# Patient Record
Sex: FEMALE | Race: WHITE | URBAN - METROPOLITAN AREA
[De-identification: names, ages, dates, MRNs, and addresses within clinical notes are randomized per-mention and may not be internally consistent; named-entity substitution may affect disease eponyms.]

---

## 2017-01-18 ENCOUNTER — OFFICE VISIT (OUTPATIENT)
Dept: PLASTIC SURGERY | Facility: CLINIC | Age: 37
End: 2017-01-18

## 2017-01-18 VITALS
WEIGHT: 136.3 LBS | SYSTOLIC BLOOD PRESSURE: 124 MMHG | BODY MASS INDEX: 22.71 KG/M2 | HEART RATE: 80 BPM | OXYGEN SATURATION: 100 % | DIASTOLIC BLOOD PRESSURE: 81 MMHG | TEMPERATURE: 98.3 F | HEIGHT: 65 IN

## 2017-01-18 DIAGNOSIS — R63.4 EXCESSIVE WEIGHT LOSS: Primary | ICD-10-CM

## 2017-01-18 ASSESSMENT — PAIN SCALES - GENERAL: PAINLEVEL: NO PAIN (0)

## 2017-01-18 NOTE — MR AVS SNAPSHOT
After Visit Summary   1/18/2017    Emily Perez    MRN: [id number]           Patient Information     Date Of Birth          1980        Visit Information        Provider Department      1/18/2017 8:30 AM ABHI Gonzalez MD Plastic and Reconstructive Surgery        Today's Diagnoses     Excessive weight loss    -  1       Care Instructions    BREAST REDUCTION POST-OPERATIVE INSTRUCTIONS    Instructions       Have someone drive you home after surgery and help you at home for 1-2      days.      Get plenty of rest.      Follow balanced diet.      Decreased activity may promote constipation, so you may want to add      more raw fruit to your diet, and be sure to increase fluid intake. Your doctor       may also order a stool softener.      Take pain medication as prescribed. Do not take aspirin or any products      containing aspirin.      Do not drink alcohol when taking pain medications.      Even when not taking pain medications, no alcohol for 3 weeks as it      causes fluid retention.      If you are taking vitamins with iron, resume these as tolerated.      Do not smoke, as smoking delays healing and increases the risk of      complications.    Activities      Do not drive fpr 10 days following your procedure and until you are no longer taking        any pain medications (narcotics).      Do not drive until you have full range of motion with your arms and can stop the car       or swerve in an emergency.      Start walking the evening of surgery, this helps to reduce swelling and       lowers the chance of blood clots.      Refrain from vigorous activities for 2-6 weeks.  Increase activity gradually as tolerated.      After 2 weeks, you may perform lower body exercise, but must wait the full 6 weeks       prior to performing upper body exercise      Avoid lifting anything over 5 pounds for 2 weeks.      Resume social and employment activities in about 2 weeks (if not too  strenuous).    Incision Care      You may shower in 48 hours.  If drainage tubes have been used, you may shower       48 hours after removal of the drains.      Avoid exposing scars to sun for at least 12 months.      Always use a strong sunblock, if sun exposure is unavoidable (SPF 50 or      greater).      Keep the tape on until it starts to curl up on the ends, and then gently remove them.        You may use moisturizing cream at 10 days to help the tape come off. By two weeks,      you may pull off the tape if still in place.      Wear your surgical bra/wrap 24/7 as directed for 4 weeks.      Avoid bras with stays and underwires for 4-6 weeks.      You may pad the incisions with gauze for comfort (panty liners also work well as they        are absorbent and inexpensive).      Inspect daily for signs of infection.      No tub soaking, bathing, or swimming until wounds are healed.      If your breast skin is dry after surgery, you can apply a moisturizer several times a       day.     What to Expect      Despite the three layers of sutures closing your incisions, there will be some oozing       of tissue fluid from them for 2 days or so.  This will soak up on the gauze and the bra       to look like more than it really is.  Report any significant drainage to the clinic.      Most of the higher discomfort will subside after the first few days.      You may experience temporary soreness, bruising, swelling and tightnessin the       breasts as well as discomfort in the incision area.      You may have have normal sensation in the nipples.  This may be more or less than       usual, and usually returns over a couple of months.      Your first menstruation following surgery may cause your breasts to swell and hurt.      You may have random shooting pains, tingling, or other strange sensations in the            skin for a few months.  These will subside.    Appearance      Most of the discoloration and swelling will  "subside in 2-4 weeks.      Your breasts will feel firm to the touch initially, but will soften with time.      A more natural shape will occur as the breasts \"settle\" in a slightly lower position over     the first few months.      Scars may be red and thick for 6-12 months (longer in lighter-skinned patients).  IN          time, these usually soften and fade.    Follow-Up Care      Typically, you will have a post op check at 1-2 weeks, and again with your surgeon in      another month.      If drainage tubes have been used, will be removed when the drainage is less than 30      ml per day for 1-2 days.  This usually happens in 1-3 weeks.    When to Call      If you have increased swelling or bruising, particular one side greater than the other.      If swelling and redness persist after a few days.      If you have increased redness along the incision.      If you have severe or increased pain not relieved by medication.      If you have any side effects to medications; such as, rash, nausea,      headache, vomiting, or constipation.      If you have an oral temperature over 100.4 degrees.      If you have any yellowish or greenish drainage from the incisions or      notice a foul odor.      If you have bleeding from the incisions that is difficult to control with      light pressure.      If you have loss of feeling or motion.      Any unanswered concern.    For Medical Questions, Please Call:      [phone], Monday - Friday, 8 a.m. - 4:30 p.m.      After hours and on weekends, call Hospital Paging at [phone] and      ask for the Plastic Surgeon on call.      INFORMED CONSENT- REDUCTION MAMMAPLASTY    INSTRUCTIONS  This is an informed-consent document that has been prepared to help your plastic surgeon inform you of reduction mammaplasty surgery, its risks, and alternative treatment. It is important that you read this information carefully and completely.    GENERAL INFORMATION  Women who have large " breasts may experience a variety of problems from the weight and size of their breasts, such as back, neck, and shoulder pain, and skin irritation. Breast reduction is usually performed for relief of these symptoms rather than to enhance the appearance of the breasts. The best candidates are those who are mature enough to understand the procedure and have realistic expectations about the results. There are a variety of different surgical techniques used to reduce and reshape the female breast. There are both risks and complications associated with reduction mammaplasty surgery.    ALTERNATIVE TREATMENT  Reduction mammaplasty is an elective surgical operation. Alternative treatment would consist of not undergoing the surgical procedure, physical therapy to treat pain complaints, or wearing undergarments to support large breasts. In selected patients, liposuction has been used to reduce the size of large breasts. Risks and potential complications are associated with alternative surgical forms of treatment.    RISKS OF REDUCTION MAMMAPLASTY SURGERY  Every surgical procedure involves a certain amount of risk. It is important that you understand the risks involved with reduction mammaplasty. An individual s choice to undergo a surgical procedure is based on the comparison of the risk to potential benefit. Although the majority of women do not experience the following complications, you should discuss each of them with your plastic surgeon to make sure you understand the  risks, potential complications and consequences of breast reduction.  Bleeding- It is possible, though unusual, to experience a bleeding episode during or after surgery. Should post-operative bleeding occur, it may require emergency treatment to drain accumulated blood or blood transfusion. Do not take any aspirin or anti-inflammatory medications for ten days before surgery, as this may  increase the risk of bleeding.  Infection- An infection is quite  "unusual after this type of surgery. Should an infection occur, treatment including antibiotics or additional surgery may be necessary.  Change in nipple and skin sensation- You may experience a change in the sensitivity of the nipples and the skin of your breast. Permanent loss of nipple sensation can occur after a reduction mammaplasty in one or both nipples. Nipple discoloration and possible need for nipple graft.  Nipple necrosis-Loss of the nipple from blood supply issues are rare but can occur.  Skin contour irregularities- Contour and shape irregularities may occur after reduction mammaplasty.  Visible and palpable wrinkling may occur. One breast may be smaller than the other. Nipple position and shape will not be identical one side to the next.  Residual skin irregularities at the ends of the incisions or \"dog ears\" are always a possibility when there is excessive redundant skin. This may improve with time, or it can be surgically corrected.  Skin scarring- All surgical incisions produce scarring. The quality of these scars is unpredictable. Abnormal scars may occur within the skin and deeper tissue. In some cases, scars may require surgical revision or other treatments.  Unsatisfactory result- There is the possibility of a poor result from the reduction mammaplasty surgery. You may be disappointed with the size and shape of your breasts as there is an inability to promise a cup size.  Unanticipated finding- Breast cancer may be found during surgery which would require further treatment.  Pain- A breast reduction may not improve complaints of musculoskeletal pain in the neck, back and shoulders. Abnormal scarring in skin and the deeper tissues of the breast may produce pain.  Firmness- Excessive firmness of the breast can occur after surgery due to internal scarring or fat necrosis.  The occurrence of this is not predictable. If an area of fat necrosis or scarring appears, this may require biopsy or " additional surgical treatment.  Delayed healing- Wound disruption or delayed wound healing is possible. Some areas of the breast skin or nipple region may not heal normally and may take a long time to heal. It is even possible to have loss of skin or nipple tissue. This may require frequent dressing changes or further surgery to remove the non-healed tissue.  Smokers have a greater risk of skin loss and wound healing complications.  Asymmetry- Some breast asymmetry naturally occurs in most women. Differences in breast and nipple shape, size, or symmetry may also occur after surgery. Additional surgery may be necessary to revise asymmetry after a reduction mammaplasty.  Breast disease- Breast disease and breast cancer can occur independently of breast reduction surgery. It is recommended that all women perform periodic self examination of their breasts, have mammography according to American Cancer Society guidelines, and to seek professional care should a breast lump be detected.  Breast feeding- Although some women have been able to breast feed after breast reduction, most women are unable to breast feed.  Allergic reactions- In rare cases, local allergies to tape, suture material, or topical preparations have been reported. Systemic reactions which are more serious may occur to drugs used during surgery and prescription medicines. Allergic reactions may require additional treatment.  Damage to Deeper Structures-There is the potential for injury to deeper structures including nerves, blood vessels, muscles, and lungs (pneumothorax) during any surgical procedure. The potential for this to occur varies according to the type of procedure being performed. Injury to deeper structures may be temporary or permanent.  Surgical Anesthesia- Both local and general anesthesia involve risk. There is the possibility of complications, injury, and even death from all forms of surgical anesthesia or sedation.   Shock and Death: In  rare circumstances, your surgical procedure can cause severe trauma, particularly when multiple or extensive procedures are performed. Although serious complications are infrequent, infections or excessive fluid loss can lead to severe illness and even death. If surgical shock occurs, hospitalization and additional treatment would be necessary.  Pain- You will experience pain after your surgery. Pain of varying intensity and duration may occur.   Medications and Herbal Dietary Supplements: There are potential adverse reactions that occur as the result of aking over-the-counter, herbal, and/or prescription medications. Aspirin and medications that contain aspirin interfere with clotting and can cause more bleeding. These include non-steroidal anti-inflammatories such as  Motrin, Advil, and Alleve. It is very important not to stop drugs that interfere with platelets, such as Plavix, which is used after a stent. It is important if you have had a stent and are taking Plavix that you inform the plastic surgeon. Stopping Plavix may result in a heart attack, stroke and even death. Be sure to check with your physician about any drug interactions that may exist with medications which you are already taking. If you have an adverse reaction, stop the drugs immediately and call your plastic surgeon for further instructions. If the reaction is severe, go immediately to the nearest emergency room. When taking the prescribed pain medications after surgery, realize that they can affect your thought process and coordination. Do not drive, do not operate complex equipment, do not make any important decisions and do not drink any alcohol while taking these medications. Be sure to take your prescribed medication only as directed.  Cardiac, Renal, and Pulmonary Complications: Pulmonary complications may occur secondarily to both blood clots (pulmonary emboli), fat deposits (fat emboli) or partial collapse of the lungs after general  anesthesia. Pulmonary emboli can be life-threatening or fatal in some circumstances. Inactivity and other conditions may increase the incidence of blood clots traveling to the lungs causing a major blood clot that may result in death. It is important to discuss with your physician any past history of swelling in your legs or blood clots that may contribute to this condition. Cardiac complications are a risk with any surgery and anesthesia, even in patients without symptoms. If you experience shortness of breath, chest pain, or unusual heart beats, seek medical attention immediately. Should any of these complications occur, you may require hospitalization and additional treatment. Renal (kidney) failure may also occur.  Venous Thrombosis and Sequelae: Thrombosed veins, which resemble cords, occasionally develop in the area of the breast or around IV sites, and usually resolve without medical or surgical treatment. It is important to discuss with your surgeon any birth control pills you are taking. Certain high estrogen pills may increase your risk  of thrombosed veins.  Allergic Reactions: In rare cases, local allergies to tape, suture material and glues, blood products, topical preparations or injected agents have been reported. Serious systemic reactions including shock (anaphylaxis) may occur in response to drugs used during surgery and prescription medicines. Allergic reactions may require  additional treatment.  Persistent Swelling (Lymphedema): Persistent swelling in the legs can occur following surgery.  Breast Disease: Current medical information does not demonstrate an increased risk of breast cancer in women who have breast reduction. Individuals with a personal history or family history of breast cancer may be at a higher risk of developing breast cancer than a woman with no family history of this disease. It is recommended that all women perform periodic self-examination of their breasts, have mammography  according to American Cancer Society guidelines, and seek professional care should they notice a breast lump.   Smoking, Second-Hand Smoke Exposure, Nicotine Products (Patch, Gum, Nasal Spray): Patients who are currently smoking or use tobacco or nicotine products (patch, gum, or nasal spray) are at a greater risk for significant surgical complications of skin dying, delayed healing and additional scarring. Individuals exposed to second-hand smoke are also at potential risk for similar complications attributable to nicotine exposure. Additionally, smoking may have a significant negative effect on anesthesia and recovery from anesthesia, with coughing and possibly increased bleeding. Individuals who are not exposed to tobacco smoke or nicotine-containing products have a significantly lower risk of this type of complication. It is important to refrain from all nicotine at least 6 weeks before surgery and until your physician states it is safe toreturn, if desired. I acknowledge that I will inform my physician if I continue to smoke within this time frame, and understand that for my safety, the surgery, if possible, may be delayed.  Female Patient Information: It is important to inform your plastic surgeon if you use birth control pills, estrogen replacement, or if you suspect you may be pregnant. Many medications including antibiotics may neutralize the preventive effect of birth control pills, allowing for conception and pregnancy.   Intimate Relations After Surgery: Surgery involves coagulating of blood vessels and increased activity of any kind may open these vessels leading to a bleed, or hematoma. Activity that increases your pulse or heart rate may cause additional bruising, swelling, and the need for return to surgery and control bleeding. It is wise to refrain from intimate physical activities until your physician states it is safe.  Mental Health Disorders and Elective Surgery: It is important that all  patients seeking to undergo elective surgery have realistic expectations that focus on improvement rather than perfection. Complications or less than satisfactory results are sometimes unavoidable, may require additional surgery and often are stressful. Please  openly discuss with your surgeon, prior to surgery, any history that you may have of significant emotional depression or mental health disorders. Although many individuals may benefit psychologically from the results of elective surgery, effects on mental health cannot be accurately predicted.  Long Term Results-Subsequent alterations in breast shape may occur as the result of aging, sun exposure, weight loss or gain, pregnancy, menopause, or other circumstances not related to your surgery.  Breast sagginess may normally occur.  Some women experience re-enlaregment of their breasts over time and may require a repeat reduction in the future.    ADDITIONAL SURGERY NECESSARY (re-operations)    There are many variable conditions that may influence the long term result of reduction mammaplasty. Secondary surgery may be necessary to perform additional tightening or repositioning of the breasts. Should complications occur, additional surgery or other treatments may be necessary. Even though risks and complications occur infrequently, the risks cited are particularly associated with breast reduction surgery.  Other complications and risks can occur but are even more uncommon. The practice of medicine and surgery is not an exact science. Although good results are expected, there is no guarantee or warranty expressed or implied, on the results that may be obtained.    HEALTH INSURANCE  Depending on your particular health insurance plan, breast reduction surgery may be considered a covered benefit. There may be additional requirements in terms of the amount of breast tissue to be removed and duration of physical problems caused by large breasts. Breast reductions  involving removal of small amounts of tissue may not be covered by your insurance. Please review your health insurance subscriber-information pamphlet, call your insurance company, and discuss this further with your plastic surgeon. Many insurance plans exclude coverage for secondary or revisionary surgery.    DISCLAIMER  lnformed-consent documents are used to communicate information about the proposed surgical treatment of a disease or condition along with disclosure of risks and alternative forms of treatment(s). The informed consent process attempts to define principles of risk disclosure that should generally meet the needs of most patients in most circumstances.  However, informed consent documents should not be considered all inclusive in defining other methods of care and risks encountered. Your plastic surgeon may provide you with additional or different information which is based on all the facts in your particular case and the state of medical knowledge.Informed-consent documents are not intended to define or serve as the standard of medical care. Standards of medical care are determined on the basis of all of the facts involved in an individual case and are subject to change as scientific knowledge and technology advance and as practice patterns evolve. It is important that you read the above information carefully and have all of your questions answered.         Follow-ups after your visit        Your next 10 appointments already scheduled     Feb 03, 2017   Procedure with ABHI Gonzalez MD   [facility] [place]    [address] [place] [address] [phone]              Future tests that were ordered for you today     Open Future Orders        Priority Expected Expires Ordered    Mammo Screening digital (bilat) Routine  1/18/2018 1/18/2017            Who to contact     Please call your clinic at [phone] to:    Ask questions about your health    Make or cancel  "appointments    Discuss your medicines    Learn about your test results    Speak to your doctor   If you have compliments or concerns about an experience at your clinic, or if you wish to file a complaint, please contact [facility] Patient Relations at [phone] or email us at [email or web address]         Additional Information About Your Visit        [employer] Information     [employer] gives you secure access to your electronic health record. If you see a primary care provider, you can also send messages to your care team and make appointments. If you have questions, please call your primary care clinic.  If you do not have a primary care provider, please call [phone] and they will assist you.      [employer] is an electronic gateway that provides easy, online access to your medical records. With [employer], you can request a clinic appointment, read your test results, renew a prescription or communicate with your care team.     To access your existing account, please contact your [facility] or call [phone] for assistance.        Care EveryWhere ID     This is your Care EveryWhere ID. This could be used by other organizations to access your [facility] medical records  [id number]        Your Vitals Were     Pulse Temperature Height BMI (Body Mass Index) Pulse Oximetry Last Period    80 98.3  F (36.8  C) (Oral) 5' 5\" 22.68 kg/m2 100% 12/28/2016 (Approximate)       Blood Pressure from Last 3 Encounters:   01/18/17 124/81   11/16/16 122/84   10/21/16 156/112    Weight from Last 3 Encounters:   01/18/17 136 lb 4.8 oz   11/16/16 134 lb 1.6 oz   10/18/16 134 lb               Primary Care Provider Office Phone # Fax #    Kelly Ramirez PA-C [phone]       [facility] [address] [place] [address]        Thank you!     Thank you for choosing [facility]  for your care. Our goal is always to provide you with " excellent care. Hearing back from our patients is one way we can continue to improve our services. Please take a few minutes to complete the written survey that you may receive in the mail after your visit with us. Thank you!             Your Updated Medication List - Protect others around you: Learn how to safely use, store and throw away your medicines at www.disposemymeds.org.          This list is accurate as of: 1/18/17  8:48 AM.  Always use your most recent med list.                   Brand Name Dispense Instructions for use    levonorgestrel-ethinyl estradiol 0.1-20 MG-MCG per tablet    [facility] LESSINA     Take 1 tablet by mouth daily       Multi-vitamin Tabs tablet      Take 1 tablet by mouth daily       OMEGA-3 FISH OIL PO          psyllium 0.52 G capsule      Take 1 capsule by mouth daily       VITAMIN D (CHOLECALCIFEROL) PO      Take by mouth daily

## 2017-01-18 NOTE — PATIENT INSTRUCTIONS
BREAST REDUCTION POST-OPERATIVE INSTRUCTIONS    Instructions       Have someone drive you home after surgery and help you at home for 1-2      days.      Get plenty of rest.      Follow balanced diet.      Decreased activity may promote constipation, so you may want to add      more raw fruit to your diet, and be sure to increase fluid intake. Your doctor       may also order a stool softener.      Take pain medication as prescribed. Do not take aspirin or any products      containing aspirin.      Do not drink alcohol when taking pain medications.      Even when not taking pain medications, no alcohol for 3 weeks as it      causes fluid retention.      If you are taking vitamins with iron, resume these as tolerated.      Do not smoke, as smoking delays healing and increases the risk of      complications.    Activities      Do not drive fpr 10 days following your procedure and until you are no longer taking        any pain medications (narcotics).      Do not drive until you have full range of motion with your arms and can stop the car       or swerve in an emergency.      Start walking the evening of surgery, this helps to reduce swelling and       lowers the chance of blood clots.      Refrain from vigorous activities for 2-6 weeks.  Increase activity gradually as tolerated.      After 2 weeks, you may perform lower body exercise, but must wait the full 6 weeks       prior to performing upper body exercise      Avoid lifting anything over 5 pounds for 2 weeks.      Resume social and employment activities in about 2 weeks (if not too strenuous).    Incision Care      You may shower in 48 hours.  If drainage tubes have been used, you may shower       48 hours after removal of the drains.      Avoid exposing scars to sun for at least 12 months.      Always use a strong sunblock, if sun exposure is unavoidable (SPF 50 or      greater).      Keep the tape on until it starts to curl up on the ends, and then gently  "remove them.        You may use moisturizing cream at 10 days to help the tape come off. By two weeks,      you may pull off the tape if still in place.      Wear your surgical bra/wrap 24/7 as directed for 4 weeks.      Avoid bras with stays and underwires for 4-6 weeks.      You may pad the incisions with gauze for comfort (panty liners also work well as they        are absorbent and inexpensive).      Inspect daily for signs of infection.      No tub soaking, bathing, or swimming until wounds are healed.      If your breast skin is dry after surgery, you can apply a moisturizer several times a       day.     What to Expect      Despite the three layers of sutures closing your incisions, there will be some oozing       of tissue fluid from them for 2 days or so.  This will soak up on the gauze and the bra       to look like more than it really is.  Report any significant drainage to the clinic.      Most of the higher discomfort will subside after the first few days.      You may experience temporary soreness, bruising, swelling and tightnessin the       breasts as well as discomfort in the incision area.      You may have have normal sensation in the nipples.  This may be more or less than       usual, and usually returns over a couple of months.      Your first menstruation following surgery may cause your breasts to swell and hurt.      You may have random shooting pains, tingling, or other strange sensations in the            skin for a few months.  These will subside.    Appearance      Most of the discoloration and swelling will subside in 2-4 weeks.      Your breasts will feel firm to the touch initially, but will soften with time.      A more natural shape will occur as the breasts \"settle\" in a slightly lower position over     the first few months.      Scars may be red and thick for 6-12 months (longer in lighter-skinned patients).  IN          time, these usually soften and fade.    Follow-Up Care      " Typically, you will have a post op check at 1-2 weeks, and again with your surgeon in      another month.      If drainage tubes have been used, will be removed when the drainage is less than 30      ml per day for 1-2 days.  This usually happens in 1-3 weeks.    When to Call      If you have increased swelling or bruising, particular one side greater than the other.      If swelling and redness persist after a few days.      If you have increased redness along the incision.      If you have severe or increased pain not relieved by medication.      If you have any side effects to medications; such as, rash, nausea,      headache, vomiting, or constipation.      If you have an oral temperature over 100.4 degrees.      If you have any yellowish or greenish drainage from the incisions or      notice a foul odor.      If you have bleeding from the incisions that is difficult to control with      light pressure.      If you have loss of feeling or motion.      Any unanswered concern.    For Medical Questions, Please Call:      [phone], Monday - Friday, 8 a.m. - 4:30 p.m.      After hours and on weekends, call Hospital Paging at [phone] and      ask for the Plastic Surgeon on call.      INFORMED CONSENT- REDUCTION MAMMAPLASTY    INSTRUCTIONS  This is an informed-consent document that has been prepared to help your plastic surgeon inform you of reduction mammaplasty surgery, its risks, and alternative treatment. It is important that you read this information carefully and completely.    GENERAL INFORMATION  Women who have large breasts may experience a variety of problems from the weight and size of their breasts, such as back, neck, and shoulder pain, and skin irritation. Breast reduction is usually performed for relief of these symptoms rather than to enhance the appearance of the breasts. The best candidates are those who are mature enough to understand the procedure and have realistic expectations about the  results. There are a variety of different surgical techniques used to reduce and reshape the female breast. There are both risks and complications associated with reduction mammaplasty surgery.    ALTERNATIVE TREATMENT  Reduction mammaplasty is an elective surgical operation. Alternative treatment would consist of not undergoing the surgical procedure, physical therapy to treat pain complaints, or wearing undergarments to support large breasts. In selected patients, liposuction has been used to reduce the size of large breasts. Risks and potential complications are associated with alternative surgical forms of treatment.    RISKS OF REDUCTION MAMMAPLASTY SURGERY  Every surgical procedure involves a certain amount of risk. It is important that you understand the risks involved with reduction mammaplasty. An individual s choice to undergo a surgical procedure is based on the comparison of the risk to potential benefit. Although the majority of women do not experience the following complications, you should discuss each of them with your plastic surgeon to make sure you understand the  risks, potential complications and consequences of breast reduction.  Bleeding- It is possible, though unusual, to experience a bleeding episode during or after surgery. Should post-operative bleeding occur, it may require emergency treatment to drain accumulated blood or blood transfusion. Do not take any aspirin or anti-inflammatory medications for ten days before surgery, as this may  increase the risk of bleeding.  Infection- An infection is quite unusual after this type of surgery. Should an infection occur, treatment including antibiotics or additional surgery may be necessary.  Change in nipple and skin sensation- You may experience a change in the sensitivity of the nipples and the skin of your breast. Permanent loss of nipple sensation can occur after a reduction mammaplasty in one or both nipples. Nipple discoloration and  "possible need for nipple graft.  Nipple necrosis-Loss of the nipple from blood supply issues are rare but can occur.  Skin contour irregularities- Contour and shape irregularities may occur after reduction mammaplasty.  Visible and palpable wrinkling may occur. One breast may be smaller than the other. Nipple position and shape will not be identical one side to the next.  Residual skin irregularities at the ends of the incisions or \"dog ears\" are always a possibility when there is excessive redundant skin. This may improve with time, or it can be surgically corrected.  Skin scarring- All surgical incisions produce scarring. The quality of these scars is unpredictable. Abnormal scars may occur within the skin and deeper tissue. In some cases, scars may require surgical revision or other treatments.  Unsatisfactory result- There is the possibility of a poor result from the reduction mammaplasty surgery. You may be disappointed with the size and shape of your breasts as there is an inability to promise a cup size.  Unanticipated finding- Breast cancer may be found during surgery which would require further treatment.  Pain- A breast reduction may not improve complaints of musculoskeletal pain in the neck, back and shoulders. Abnormal scarring in skin and the deeper tissues of the breast may produce pain.  Firmness- Excessive firmness of the breast can occur after surgery due to internal scarring or fat necrosis.  The occurrence of this is not predictable. If an area of fat necrosis or scarring appears, this may require biopsy or additional surgical treatment.  Delayed healing- Wound disruption or delayed wound healing is possible. Some areas of the breast skin or nipple region may not heal normally and may take a long time to heal. It is even possible to have loss of skin or nipple tissue. This may require frequent dressing changes or further surgery to remove the non-healed tissue.  Smokers have a greater risk of skin " loss and wound healing complications.  Asymmetry- Some breast asymmetry naturally occurs in most women. Differences in breast and nipple shape, size, or symmetry may also occur after surgery. Additional surgery may be necessary to revise asymmetry after a reduction mammaplasty.  Breast disease- Breast disease and breast cancer can occur independently of breast reduction surgery. It is recommended that all women perform periodic self examination of their breasts, have mammography according to American Cancer Society guidelines, and to seek professional care should a breast lump be detected.  Breast feeding- Although some women have been able to breast feed after breast reduction, most women are unable to breast feed.  Allergic reactions- In rare cases, local allergies to tape, suture material, or topical preparations have been reported. Systemic reactions which are more serious may occur to drugs used during surgery and prescription medicines. Allergic reactions may require additional treatment.  Damage to Deeper Structures-There is the potential for injury to deeper structures including nerves, blood vessels, muscles, and lungs (pneumothorax) during any surgical procedure. The potential for this to occur varies according to the type of procedure being performed. Injury to deeper structures may be temporary or permanent.  Surgical Anesthesia- Both local and general anesthesia involve risk. There is the possibility of complications, injury, and even death from all forms of surgical anesthesia or sedation.   Shock and Death: In rare circumstances, your surgical procedure can cause severe trauma, particularly when multiple or extensive procedures are performed. Although serious complications are infrequent, infections or excessive fluid loss can lead to severe illness and even death. If surgical shock occurs, hospitalization and additional treatment would be necessary.  Pain- You will experience pain after your surgery.  Pain of varying intensity and duration may occur.   Medications and Herbal Dietary Supplements: There are potential adverse reactions that occur as the result of aking over-the-counter, herbal, and/or prescription medications. Aspirin and medications that contain aspirin interfere with clotting and can cause more bleeding. These include non-steroidal anti-inflammatories such as  Motrin, Advil, and Alleve. It is very important not to stop drugs that interfere with platelets, such as Plavix, which is used after a stent. It is important if you have had a stent and are taking Plavix that you inform the plastic surgeon. Stopping Plavix may result in a heart attack, stroke and even death. Be sure to check with your physician about any drug interactions that may exist with medications which you are already taking. If you have an adverse reaction, stop the drugs immediately and call your plastic surgeon for further instructions. If the reaction is severe, go immediately to the nearest emergency room. When taking the prescribed pain medications after surgery, realize that they can affect your thought process and coordination. Do not drive, do not operate complex equipment, do not make any important decisions and do not drink any alcohol while taking these medications. Be sure to take your prescribed medication only as directed.  Cardiac, Renal, and Pulmonary Complications: Pulmonary complications may occur secondarily to both blood clots (pulmonary emboli), fat deposits (fat emboli) or partial collapse of the lungs after general anesthesia. Pulmonary emboli can be life-threatening or fatal in some circumstances. Inactivity and other conditions may increase the incidence of blood clots traveling to the lungs causing a major blood clot that may result in death. It is important to discuss with your physician any past history of swelling in your legs or blood clots that may contribute to this condition. Cardiac complications are  a risk with any surgery and anesthesia, even in patients without symptoms. If you experience shortness of breath, chest pain, or unusual heart beats, seek medical attention immediately. Should any of these complications occur, you may require hospitalization and additional treatment. Renal (kidney) failure may also occur.  Venous Thrombosis and Sequelae: Thrombosed veins, which resemble cords, occasionally develop in the area of the breast or around IV sites, and usually resolve without medical or surgical treatment. It is important to discuss with your surgeon any birth control pills you are taking. Certain high estrogen pills may increase your risk  of thrombosed veins.  Allergic Reactions: In rare cases, local allergies to tape, suture material and glues, blood products, topical preparations or injected agents have been reported. Serious systemic reactions including shock (anaphylaxis) may occur in response to drugs used during surgery and prescription medicines. Allergic reactions may require  additional treatment.  Persistent Swelling (Lymphedema): Persistent swelling in the legs can occur following surgery.  Breast Disease: Current medical information does not demonstrate an increased risk of breast cancer in women who have breast reduction. Individuals with a personal history or family history of breast cancer may be at a higher risk of developing breast cancer than a woman with no family history of this disease. It is recommended that all women perform periodic self-examination of their breasts, have mammography according to American Cancer Society guidelines, and seek professional care should they notice a breast lump.   Smoking, Second-Hand Smoke Exposure, Nicotine Products (Patch, Gum, Nasal Spray): Patients who are currently smoking or use tobacco or nicotine products (patch, gum, or nasal spray) are at a greater risk for significant surgical complications of skin dying, delayed healing and additional  scarring. Individuals exposed to second-hand smoke are also at potential risk for similar complications attributable to nicotine exposure. Additionally, smoking may have a significant negative effect on anesthesia and recovery from anesthesia, with coughing and possibly increased bleeding. Individuals who are not exposed to tobacco smoke or nicotine-containing products have a significantly lower risk of this type of complication. It is important to refrain from all nicotine at least 6 weeks before surgery and until your physician states it is safe toreturn, if desired. I acknowledge that I will inform my physician if I continue to smoke within this time frame, and understand that for my safety, the surgery, if possible, may be delayed.  Female Patient Information: It is important to inform your plastic surgeon if you use birth control pills, estrogen replacement, or if you suspect you may be pregnant. Many medications including antibiotics may neutralize the preventive effect of birth control pills, allowing for conception and pregnancy.   Intimate Relations After Surgery: Surgery involves coagulating of blood vessels and increased activity of any kind may open these vessels leading to a bleed, or hematoma. Activity that increases your pulse or heart rate may cause additional bruising, swelling, and the need for return to surgery and control bleeding. It is wise to refrain from intimate physical activities until your physician states it is safe.  Mental Health Disorders and Elective Surgery: It is important that all patients seeking to undergo elective surgery have realistic expectations that focus on improvement rather than perfection. Complications or less than satisfactory results are sometimes unavoidable, may require additional surgery and often are stressful. Please  openly discuss with your surgeon, prior to surgery, any history that you may have of significant emotional depression or mental health disorders.  Although many individuals may benefit psychologically from the results of elective surgery, effects on mental health cannot be accurately predicted.  Long Term Results-Subsequent alterations in breast shape may occur as the result of aging, sun exposure, weight loss or gain, pregnancy, menopause, or other circumstances not related to your surgery.  Breast sagginess may normally occur.  Some women experience re-enlaregment of their breasts over time and may require a repeat reduction in the future.    ADDITIONAL SURGERY NECESSARY (re-operations)    There are many variable conditions that may influence the long term result of reduction mammaplasty. Secondary surgery may be necessary to perform additional tightening or repositioning of the breasts. Should complications occur, additional surgery or other treatments may be necessary. Even though risks and complications occur infrequently, the risks cited are particularly associated with breast reduction surgery.  Other complications and risks can occur but are even more uncommon. The practice of medicine and surgery is not an exact science. Although good results are expected, there is no guarantee or warranty expressed or implied, on the results that may be obtained.    HEALTH INSURANCE  Depending on your particular health insurance plan, breast reduction surgery may be considered a covered benefit. There may be additional requirements in terms of the amount of breast tissue to be removed and duration of physical problems caused by large breasts. Breast reductions involving removal of small amounts of tissue may not be covered by your insurance. Please review your health insurance subscriber-information pamphlet, call your insurance company, and discuss this further with your plastic surgeon. Many insurance plans exclude coverage for secondary or revisionary surgery.    DISCLAIMER  lnformed-consent documents are used to communicate information about the proposed surgical  treatment of a disease or condition along with disclosure of risks and alternative forms of treatment(s). The informed consent process attempts to define principles of risk disclosure that should generally meet the needs of most patients in most circumstances.  However, informed consent documents should not be considered all inclusive in defining other methods of care and risks encountered. Your plastic surgeon may provide you with additional or different information which is based on all the facts in your particular case and the state of medical knowledge.Informed-consent documents are not intended to define or serve as the standard of medical care. Standards of medical care are determined on the basis of all of the facts involved in an individual case and are subject to change as scientific knowledge and technology advance and as practice patterns evolve. It is important that you read the above information carefully and have all of your questions answered.

## 2017-01-18 NOTE — NURSING NOTE
"Chief Complaint   Patient presents with     Follow Up For     2 month post op check in       Filed Vitals:    01/18/17 0832   BP: 124/81   Pulse: 80   Temp: 98.3  F (36.8  C)   TempSrc: Oral   Height: 5' 5\"   Weight: 136 lb 4.8 oz   SpO2: 100%       Body mass index is 22.68 kg/(m^2).      Sally Song                            "

## 2017-01-18 NOTE — PROGRESS NOTES
PRESENTING COMPLAINT:  Preoperative visit for upcoming bilateral mastopexy and brachioplasty scheduled for 2017.      HISTORY OF PRESENTING COMPLAINT:  Ms. Wise is 36 years old, here for a preoperative visit for upcoming body contouring surgery.  She has had no change in her history and physical exam.  She is being cleared by her medical doctors in the coming week.      ASSESSMENT AND PLAN:  Based on above findings, a diagnosis of massive weight loss with symptomatic excess upper arm skin and grade 3 ptosis of the breasts was made.  The patient is undergoing bilateral mastopexy and brachioplasty.  I went over the procedure with the patient in detail, showed her where the scars would be.  All expectations were discussed.  All risks, benefits and alternatives including pain, infection, bleeding, scarring, asymmetry, seromas, hematomas, wound breakdown, wound dehiscence, seromas, hematomas, prominent scar, hypertrophic scar, keloid scar, asymmetries, requirement of further surgeries in the future, numbness, tingling, nipple sensory loss, swelling of the arms, numbness in the forearm, nipple necrosis, skin necrosis, fat necrosis, T-junction site necrosis, DVT, PE, MI, CVA, pneumonia and death were explained.  She understood them all and wants to proceed.  All questions were answered.  All exam and discussion done in the presence of my nurse.      Total time spent with patient was 15 minutes, more than half counseling.         ABHI OCASIO MD             D: 2017 11:34   T: 2017 17:58   MT: KG      Name:     CHANDRAKANT WISE   MRN:      [id number]        Account:      [id number]   :      1980           Service Date: 2017      Document: [id number]

## 2017-02-03 ENCOUNTER — SURGERY (OUTPATIENT)
Age: 37
End: 2017-02-03
Payer: COMMERCIAL

## 2017-02-03 ENCOUNTER — ANESTHESIA EVENT (OUTPATIENT)
Dept: SURGERY | Facility: AMBULATORY SURGERY CENTER | Age: 37
End: 2017-02-03

## 2017-02-03 ENCOUNTER — ANESTHESIA (OUTPATIENT)
Dept: SURGERY | Facility: AMBULATORY SURGERY CENTER | Age: 37
End: 2017-02-03

## 2017-02-03 ENCOUNTER — TELEPHONE (OUTPATIENT)
Dept: SURGERY | Facility: CLINIC | Age: 37
End: 2017-02-03

## 2017-02-03 PROCEDURE — 15836 EXC EXCESSIVE SKIN ARM: CPT | Mod: 50 | Performed by: PLASTIC SURGERY

## 2017-02-03 PROCEDURE — 19316 MASTOPEXY: CPT | Mod: 50 | Performed by: PLASTIC SURGERY

## 2017-02-03 RX ORDER — PROPOFOL 10 MG/ML
INJECTION, EMULSION INTRAVENOUS PRN
Status: DISCONTINUED | OUTPATIENT
Start: 2017-02-03 | End: 2017-02-03

## 2017-02-03 RX ORDER — DEXAMETHASONE SODIUM PHOSPHATE 4 MG/ML
INJECTION, SOLUTION INTRA-ARTICULAR; INTRALESIONAL; INTRAMUSCULAR; INTRAVENOUS; SOFT TISSUE PRN
Status: DISCONTINUED | OUTPATIENT
Start: 2017-02-03 | End: 2017-02-03

## 2017-02-03 RX ORDER — PROPOFOL 10 MG/ML
INJECTION, EMULSION INTRAVENOUS CONTINUOUS PRN
Status: DISCONTINUED | OUTPATIENT
Start: 2017-02-03 | End: 2017-02-03

## 2017-02-03 RX ORDER — ONDANSETRON 2 MG/ML
INJECTION INTRAMUSCULAR; INTRAVENOUS PRN
Status: DISCONTINUED | OUTPATIENT
Start: 2017-02-03 | End: 2017-02-03

## 2017-02-03 RX ORDER — FENTANYL CITRATE 50 UG/ML
INJECTION, SOLUTION INTRAMUSCULAR; INTRAVENOUS PRN
Status: DISCONTINUED | OUTPATIENT
Start: 2017-02-03 | End: 2017-02-03

## 2017-02-03 RX ORDER — LIDOCAINE HYDROCHLORIDE 20 MG/ML
INJECTION, SOLUTION INFILTRATION; PERINEURAL PRN
Status: DISCONTINUED | OUTPATIENT
Start: 2017-02-03 | End: 2017-02-03

## 2017-02-03 RX ADMIN — CEFAZOLIN SODIUM 2 G: 2 INJECTION, SOLUTION INTRAVENOUS at 07:35

## 2017-02-03 RX ADMIN — ONDANSETRON 4 MG: 2 INJECTION INTRAMUSCULAR; INTRAVENOUS at 10:39

## 2017-02-03 RX ADMIN — DEXAMETHASONE SODIUM PHOSPHATE 10 MG: 4 INJECTION, SOLUTION INTRA-ARTICULAR; INTRALESIONAL; INTRAMUSCULAR; INTRAVENOUS; SOFT TISSUE at 07:29

## 2017-02-03 RX ADMIN — CEFAZOLIN SODIUM 1 G: 2 INJECTION, SOLUTION INTRAVENOUS at 09:37

## 2017-02-03 RX ADMIN — Medication 0.5 MG: at 08:11

## 2017-02-03 RX ADMIN — PROPOFOL 50 MG: 10 INJECTION, EMULSION INTRAVENOUS at 07:59

## 2017-02-03 RX ADMIN — SODIUM CHLORIDE, SODIUM LACTATE, POTASSIUM CHLORIDE, CALCIUM CHLORIDE: 600; 310; 30; 20 INJECTION, SOLUTION INTRAVENOUS at 07:20

## 2017-02-03 RX ADMIN — FENTANYL CITRATE 100 MCG: 50 INJECTION, SOLUTION INTRAMUSCULAR; INTRAVENOUS at 07:26

## 2017-02-03 RX ADMIN — BUPIVACAINE HYDROCHLORIDE 50 ML: 2.5 INJECTION, SOLUTION INFILTRATION; PERINEURAL at 10:20

## 2017-02-03 RX ADMIN — PROPOFOL 200 MCG/KG/MIN: 10 INJECTION, EMULSION INTRAVENOUS at 07:26

## 2017-02-03 RX ADMIN — SODIUM CHLORIDE, SODIUM LACTATE, POTASSIUM CHLORIDE, CALCIUM CHLORIDE: 600; 310; 30; 20 INJECTION, SOLUTION INTRAVENOUS at 11:01

## 2017-02-03 RX ADMIN — PROPOFOL 200 MG: 10 INJECTION, EMULSION INTRAVENOUS at 07:26

## 2017-02-03 RX ADMIN — Medication 0.5 MG: at 11:01

## 2017-02-03 RX ADMIN — LIDOCAINE HYDROCHLORIDE 60 MG: 20 INJECTION, SOLUTION INFILTRATION; PERINEURAL at 07:26

## 2017-02-03 RX ADMIN — Medication 0.5 MG: at 08:00

## 2017-02-03 NOTE — ANESTHESIA CARE TRANSFER NOTE
Patient: Emily Perez    Procedure(s):  Brachioplasty and mastopexy - Wound Class: I-Clean   - Wound Class: I-Clean    Diagnosis: excessive skin  Diagnosis Additional Information: No value filed.    Anesthesia Type:   General, ETT     Note:  Airway :Nasal Cannula  Patient transferred to:PACU  Comments: To PACU, awake, comfortable, sats 100%, NC. Report to RN.      Vitals: (Last set prior to Anesthesia Care Transfer)    CRNA VITALS  2/3/2017 1031 - 2/3/2017 1107      2/3/2017             Pulse: 79    SpO2: 99 %                Electronically Signed By: BHARATI Alvarenga CRNA  February 3, 2017  11:07 AM

## 2017-02-03 NOTE — ANESTHESIA POSTPROCEDURE EVALUATION
Patient: Emily Perez    Procedure(s):  Brachioplasty and mastopexy - Wound Class: I-Clean   - Wound Class: I-Clean    Diagnosis:excessive skin  Diagnosis Additional Information: No value filed.    Anesthesia Type:  General, ETT    Note:  Anesthesia Post Evaluation    Patient location during evaluation: PACU  Patient participation: Able to fully participate in evaluation  Level of consciousness: awake  Pain management: adequate  Airway patency: patent  Cardiovascular status: acceptable  Respiratory status: acceptable  Hydration status: balanced  PONV: none     Anesthetic complications: None          Last vitals:  Filed Vitals:    02/03/17 1102 02/03/17 1105 02/03/17 1110   BP: 154/79 147/81    Temp: 97.1  F (36.2  C)     Resp: 14 9    SpO2: 100% 100% 100%         Electronically Signed By: Christopher Kraus MD  February 3, 2017  11:15 AM

## 2017-02-03 NOTE — TELEPHONE ENCOUNTER
Pt called, No answer.  does not identify pt. Left general message for pt to call the [facility] clinic back at [phone]....Trang De Leon RN

## 2017-02-03 NOTE — ANESTHESIA PREPROCEDURE EVALUATION
Anesthesia Evaluation     .        ROS/MED HX    ENT/Pulmonary:  - neg pulmonary ROS     Neurologic:       Cardiovascular:  - neg cardiovascular ROS       METS/Exercise Tolerance:     Hematologic:         Musculoskeletal:         GI/Hepatic:         Renal/Genitourinary:         Endo:         Psychiatric:         Infectious Disease:         Malignancy:         Other:               Physical Exam  Normal systems: pulmonary and dental    Airway   Mallampati: II  TM distance: >3 FB  Neck ROM: full    Dental     Cardiovascular   Rhythm and rate: regular and normal      Pulmonary                     Anesthesia Plan      History & Physical Review  History and physical reviewed and following examination; no interval change.    ASA Status:  2 .    NPO Status:  > 6 hours    Plan for General and ETT with Intravenous and Propofol induction. Maintenance will be TIVA.    PONV prophylaxis:  Ondansetron (or other 5HT-3)       Postoperative Care  Postoperative pain management:  IV analgesics and Multi-modal analgesia.      Consents                          .

## 2017-02-03 NOTE — TELEPHONE ENCOUNTER
----- Message from ABHI Gonzalez MD sent at 2/3/2017 10:53 AM CST -----  Regarding: Post-op appt  Hi,    Please make a 2 week post-op appt.    Thanks    Darrel

## 2017-02-06 NOTE — TELEPHONE ENCOUNTER
Pt left a Voice Mail message to return her call.    Called and spoke with pt.  Pt stated she needed a return appt with Dr. Gonzalez.  Pt requested 2/14/17 at 2:50pm.  appt scheduled.  Advised to call if any further questions or concerns.    Pamela Sellers LPN

## 2017-02-14 ENCOUNTER — OFFICE VISIT (OUTPATIENT)
Dept: SURGERY | Facility: CLINIC | Age: 37
End: 2017-02-14
Payer: COMMERCIAL

## 2017-02-14 DIAGNOSIS — R63.4 EXCESSIVE WEIGHT LOSS: Primary | ICD-10-CM

## 2017-02-14 PROCEDURE — 99024 POSTOP FOLLOW-UP VISIT: CPT | Performed by: PLASTIC SURGERY

## 2017-02-14 NOTE — MR AVS SNAPSHOT
After Visit Summary   2/14/2017    Emily Perez    MRN: [id number]           Patient Information     Date Of Birth          1980        Visit Information        Provider Department      2/14/2017 2:50 PM ABHI Gonzalez MD [facility]        Today's Diagnoses     Excessive weight loss    -  1       Follow-ups after your visit        Who to contact     If you have questions or need follow up information about today's clinic visit or your schedule please contact [facility] directly at [phone].  Normal or non-critical lab and imaging results will be communicated to you by Ukashhart, letter or phone within 4 business days after the clinic has received the results. If you do not hear from us within 7 days, please contact the clinic through Moonshoott or phone. If you have a critical or abnormal lab result, we will notify you by phone as soon as possible.  Submit refill requests through Fix That Bug or call your pharmacy and they will forward the refill request to us. Please allow 3 business days for your refill to be completed.          Additional Information About Your Visit        MyChart Information     Fix That Bug gives you secure access to your electronic health record. If you see a primary care provider, you can also send messages to your care team and make appointments. If you have questions, please call your primary care clinic.  If you do not have a primary care provider, please call [phone] and they will assist you.      Fix That Bug is an electronic gateway that provides easy, online access to your medical records. With Fix That Bug, you can request a clinic appointment, read your test results, renew a prescription or communicate with your care team.     To access your existing account, please contact your [facility] or call [phone] for assistance.        Care EveryWhere ID     This is your Care EveryWhere ID. This could be  used by other organizations to access your [place] medical records  [id number]        Your Vitals Were     Last Period                   01/23/2017            Blood Pressure from Last 3 Encounters:   02/03/17 142/80   01/18/17 124/81   11/16/16 122/84    Weight from Last 3 Encounters:   01/18/17 136 lb 4.8 oz   11/16/16 134 lb 1.6 oz   10/18/16 134 lb              Today, you had the following     No orders found for display       Primary Care Provider Office Phone # Fax #    Kelly Ramirez PA-C [phone]       [facility] [address]  [place] [address]        Thank you!     Thank you for choosing [facility]  for your care. Our goal is always to provide you with excellent care. Hearing back from our patients is one way we can continue to improve our services. Please take a few minutes to complete the written survey that you may receive in the mail after your visit with us. Thank you!             Your Updated Medication List - Protect others around you: Learn how to safely use, store and throw away your medicines at www.disposemymeds.org.          This list is accurate as of: 2/14/17 11:59 PM.  Always use your most recent med list.                   Brand Name Dispense Instructions for use    levonorgestrel-ethinyl estradiol 0.1-20 MG-MCG per tablet    MARU HILL LESSINA     Take 1 tablet by mouth daily       Multi-vitamin Tabs tablet      Take 1 tablet by mouth daily       OMEGA-3 FISH OIL PO          ondansetron 4 MG ODT tab    ZOFRAN-ODT    8 tablet    Take 1 tablet (4 mg) by mouth every 6 hours as needed for nausea       oxyCODONE 5 MG IR tablet    ROXICODONE    30 tablet    Take 1-2 tablets (5-10 mg) by mouth every 4 hours as needed for other (Moderate to Severe Pain)       psyllium 0.52 G capsule      Take 1 capsule by mouth daily       senna-docusate 8.6-50 MG per tablet    SENOKOT-S;PERICOLACE    30 tablet    Take 1-2 tablets by mouth 2 times daily       TYLENOL PO           VITAMIN D (CHOLECALCIFEROL) PO      Take by mouth daily

## 2017-02-14 NOTE — NURSING NOTE
"Emily Perez's goals for this visit include: post op liposuction, brachiplasty and mastoplexy  She requests these members of her care team be copied on today's visit information: no    PCP: Kelly Ramirez    Referring Provider:  No referring provider defined for this encounter.    Chief Complaint   Patient presents with     Surgical Followup     post op       Initial LMP 01/23/2017 Estimated body mass index is 22.68 kg/(m^2) as calculated from the following:    Height as of 1/18/17: 1.651 m (5' 5\").    Weight as of 1/18/17: 61.8 kg (136 lb 4.8 oz).  Medication Reconciliation: complete    Do you need any medication refills at today's visit? No    Fernanda Blevins LPN      "

## 2017-02-15 NOTE — PROGRESS NOTES
PRESENTING COMPLAINT:  Postoperative visit status post cosmetic bilateral brachioplasty and cosmetic bilateral breast lifts done on 2017.      HISTORY OF PRESENTING COMPLAINT:  Ms. Emily Wise is 36 years old.  She is almost 2 weeks out from surgery.  Done extremely well at home.  No major issues and happy with the results.      PHYSICAL EXAMINATION:   VITAL SIGNS:  Stable.  She is afebrile.   GENERAL:  No obvious distress.   BREASTS AND ARMS:  Took down all the dressings.  Her breasts are healing in well.  Nipples are pink and viable.  Incisions on the arms are healing in well.  No evidence of seroma, hematoma or infection.  I took out all the staples.      ASSESSMENT AND PLAN:  Based on above findings, diagnosis of bilateral cosmetic mastopexy and brachioplasty was made.  Plan is to continue to moisturize all the wounds, continue to wrap her arms and chest for another 2 weeks.  I will see her back in 4-6 weeks.      All questions were answered.  She was happy with the visit.         ABHI OCASIO MD             D: 2017 17:43   T: 02/15/2017 10:44   MT: DONNA      Name:     EMILY WISE   MRN:      [id number]        Account:      [id number]   :      1980           Visit Date:   2017      Document: [id number]

## 2017-03-22 ENCOUNTER — OFFICE VISIT (OUTPATIENT)
Dept: PLASTIC SURGERY | Facility: CLINIC | Age: 37
End: 2017-03-22

## 2017-03-22 VITALS
OXYGEN SATURATION: 98 % | HEART RATE: 80 BPM | DIASTOLIC BLOOD PRESSURE: 88 MMHG | WEIGHT: 135.9 LBS | HEIGHT: 65 IN | SYSTOLIC BLOOD PRESSURE: 127 MMHG | TEMPERATURE: 97.2 F | BODY MASS INDEX: 22.64 KG/M2

## 2017-03-22 DIAGNOSIS — R63.4 EXCESSIVE WEIGHT LOSS: Primary | ICD-10-CM

## 2017-03-22 RX ORDER — CALCIUM POLYCARBOPHIL 625 MG 625 MG/1
1 TABLET ORAL
COMMUNITY
Start: 2012-12-18

## 2017-03-22 ASSESSMENT — PAIN SCALES - GENERAL: PAINLEVEL: NO PAIN (0)

## 2017-03-22 NOTE — NURSING NOTE
"Chief Complaint   Patient presents with     Follow Up For     follow up       Vitals:    03/22/17 0844   BP: 127/88   BP Location: Left arm   Patient Position: Chair   Cuff Size: Adult Regular   Pulse: 80   Temp: 97.2  F (36.2  C)   TempSrc: Oral   SpO2: 98%   Weight: 135 lb 14.4 oz   Height: 5' 5\"       Body mass index is 22.61 kg/(m^2).      Sally Song                          "

## 2017-03-22 NOTE — LETTER
3/22/2017       RE: Emily Wise  [address] [address]     Dear Colleague,    Thank you for referring your patient, Emily Wise, to the [facility] PLASTIC AND RECONSTRUCTIVE SURGERY at [facility]. Please see a copy of my visit note below.    PRESENTING COMPLAINT:  Postoperative visit, status post cosmetic bilateral brachioplasty and cosmetic bilateral breast lifts done on 02/03/2017.      HISTORY OF PRESENTING COMPLAINT:  Ms. Wise is 37 years old.  She is now 6 weeks out from surgery, completely healed, very happy with the results, no issues.      PHYSICAL EXAMINATION:  On exam vital signs stable.  She is afebrile in no obvious distress. All wounds have healed in well.      ASSESSMENT AND PLAN:  Based on above findings, a diagnosis of bilateral cosmetic breast lift and brachioplasty was made.  I have reiterated the importance of moisturizing her scars and allowing them to mature over the next year.  The patient is now interested in proceeding with an upper back/bra lift to help with the rolls in the upper back and lateral chest area.  I went over the planned procedure with her in detail, showed her where the scars would be.  All risks, benefits and alternatives of the potential procedure including pain, infection, bleeding, scarring, asymmetry, seromas, hematomas, wound breakdown, wound dehiscence, prominent scar, hypertrophic scar, keloid scar, asymmetries, wound problems, DVT, PE, MI, CVA, pneumonia and death were explained.  She understood them and wants to proceed.  We will send her quotes for this procedure and proceed in about 2-3 months' time.  All questions were answered.  She was happy with the visit.  All exam done in the presence of my nurse.  Consent was obtained and photographs obtained.     ABHI OCASIO MD      D: 03/22/2017 15:02   T: 03/23/2017 06:46   MT: nh      Name:     EMILY WISE   MRN:      [id number]         Account:      [id number]   :      1980           Service Date: 2017      Document: [id number]

## 2017-03-22 NOTE — MR AVS SNAPSHOT
After Visit Summary   3/22/2017    Emily Perez    MRN: [id number]           Patient Information     Date Of Birth          1980        Visit Information        Provider Department      3/22/2017 8:45 AM ABHI Gonzalez MD [facility] Plastic and Reconstructive Surgery        Today's Diagnoses     Excessive weight loss    -  1       Follow-ups after your visit        Follow-up notes from your care team     Return if symptoms worsen or fail to improve.      Who to contact     Please call your clinic at [phone] to:    Ask questions about your health    Make or cancel appointments    Discuss your medicines    Learn about your test results    Speak to your doctor   If you have compliments or concerns about an experience at your clinic, or if you wish to file a complaint, please contact [facility] Relations at [phone] or email us at [email or web address]         Additional Information About Your Visit        MyChart Information     Yi [employer] O[employer] Sai ITt gives you secure access to your electronic health record. If you see a primary care provider, you can also send messages to your care team and make appointments. If you have questions, please call your primary care clinic.  If you do not have a primary care provider, please call [phone] and they will assist you.      Kahnoodle is an electronic gateway that provides easy, online access to your medical records. With Kahnoodle, you can request a clinic appointment, read your test results, renew a prescription or communicate with your care team.     To access your existing account, please contact your [facility] or call [phone] for assistance.        Care EveryWhere ID     This is your Care EveryWhere ID. This could be used by other organizations to access your [place] medical records  [id number]        Your Vitals Were     Pulse Temperature Height Last Period Pulse  "Oximetry BMI (Body Mass Index)    80 97.2  F (36.2  C) (Oral) 5' 5\" 03/21/2017 (Exact Date) 98% 22.61 kg/m2       Blood Pressure from Last 3 Encounters:   03/22/17 127/88   02/03/17 142/80   01/18/17 124/81    Weight from Last 3 Encounters:   03/22/17 135 lb 14.4 oz   01/18/17 136 lb 4.8 oz   11/16/16 134 lb 1.6 oz              Today, you had the following     No orders found for display       Primary Care Provider Office Phone # Fax #    Kelly Ramirez PA-C [phone]       [facility] [address] [place] [address]        Thank you!     Thank you for choosing [facility] PLASTIC AND RECONSTRUCTIVE SURGERY  for your care. Our goal is always to provide you with excellent care. Hearing back from our patients is one way we can continue to improve our services. Please take a few minutes to complete the written survey that you may receive in the mail after your visit with us. Thank you!             Your Updated Medication List - Protect others around you: Learn how to safely use, store and throw away your medicines at www.disposemymeds.org.          This list is accurate as of: 3/22/17 11:59 PM.  Always use your most recent med list.                   Brand Name Dispense Instructions for use    FIBERCON 625 MG tablet   Generic drug:  calcium polycarbophil      Take 1 tablet by mouth       levonorgestrel-ethinyl estradiol 0.1-20 MG-MCG per tablet    MARU HILL LESSINA     Take 1 tablet by mouth daily       Multi-vitamin Tabs tablet      Take 1 tablet by mouth daily       OMEGA-3 FISH OIL PO          TYLENOL PO          VITAMIN D (CHOLECALCIFEROL) PO      Take by mouth daily         "

## 2017-03-23 ENCOUNTER — TELEPHONE (OUTPATIENT)
Dept: SURGERY | Facility: CLINIC | Age: 37
End: 2017-03-23

## 2017-03-23 NOTE — PROGRESS NOTES
PRESENTING COMPLAINT:  Postoperative visit, status post cosmetic bilateral brachioplasty and cosmetic bilateral breast lifts done on 2017.      HISTORY OF PRESENTING COMPLAINT:  Ms. Wise is 37 years old.  She is now 6 weeks out from surgery, completely healed, very happy with the results, no issues.      PHYSICAL EXAMINATION:  On exam vital signs stable.  She is afebrile in no obvious distress. All wounds have healed in well.      ASSESSMENT AND PLAN:  Based on above findings, a diagnosis of bilateral cosmetic breast lift and brachioplasty was made.  I have reiterated the importance of moisturizing her scars and allowing them to mature over the next year.  The patient is now interested in proceeding with an upper back/bra lift to help with the rolls in the upper back and lateral chest area.  I went over the planned procedure with her in detail, showed her where the scars would be.  All risks, benefits and alternatives of the potential procedure including pain, infection, bleeding, scarring, asymmetry, seromas, hematomas, wound breakdown, wound dehiscence, prominent scar, hypertrophic scar, keloid scar, asymmetries, wound problems, DVT, PE, MI, CVA, pneumonia and death were explained.  She understood them and wants to proceed.  We will send her quotes for this procedure and proceed in about 2-3 months' time.  All questions were answered.  She was happy with the visit.  All exam done in the presence of my nurse.  Consent was obtained and photographs obtained.         ABHI OCASIO MD             D: 2017 15:02   T: 2017 06:46   MT: nh      Name:     CHANDRAKANT WISE   MRN:      [id number]        Account:      [id number]   :      1980           Service Date: 2017      Document: [id number]

## 2017-03-23 NOTE — TELEPHONE ENCOUNTER
Left message for the patient to call back and receive quote for an upper back lift.     Upper back lift (code 95088) : $2275  Facilities/operating room (2 hours) $2070    Total: $4342

## 2017-04-06 ENCOUNTER — TELEPHONE (OUTPATIENT)
Dept: DERMATOLOGY | Facility: CLINIC | Age: 37
End: 2017-04-06

## 2017-04-06 DIAGNOSIS — L98.7 EXCESS SKIN: Primary | ICD-10-CM

## 2017-04-06 NOTE — TELEPHONE ENCOUNTER
Pt called to say that she received a quote for an upper back lift and is interested in scheduling surgery. Pt advised that RN will notify Dr. Gonzalez to place orders or advise if he would like to see pt again first. Pt instructed that she will be contacted by either RN or surgery scheduler once Dr. Gonzalez has advised on this. Pt states understanding of this...Trang De Leon RN

## 2017-04-11 NOTE — TELEPHONE ENCOUNTER
Dr. Gonzalez notified and message was sent to surgery scheduler to schedule surgery...ABHI Bullock RN, MD [place]man, Colleen E, RN; Ольга Gallardo Liz, please schedule surgery. EPIC orders and worksheet filled.     If she wants she can come in for a pre-op.     Thanks!     Darrel

## 2017-04-17 NOTE — TELEPHONE ENCOUNTER
I left a message for the patient to call back to get surgery scheduled.  Imelda Gallardo, Surgery Scheduling Coordinator

## 2017-04-17 NOTE — TELEPHONE ENCOUNTER
Date Scheduled: 6-16-17  Facility: [facility]  Surgeon: Dr. Gonzalez   Post-op appointment scheduled:   Next 5 appointments (look out 90 days)     Jun 30, 2017  1:00 PM CDT   Return Visit with ABHI Gonzalez MD   [facility]    [address] [place] [phone]                   scheduled?: No  Surgery packet/instructions confirmed received?  Yes, mailed  Special Considerations:   Imelda Gallardo, Surgery Scheduling Coordinator

## 2017-04-18 ENCOUNTER — TELEPHONE (OUTPATIENT)
Dept: SURGERY | Facility: CLINIC | Age: 37
End: 2017-04-18

## 2017-04-18 NOTE — TELEPHONE ENCOUNTER
Pt called, No answer.  does not identify pt. Left general message for pt to call the [facility] clinic back at [phone]....Trang De Leon RN

## 2017-04-21 NOTE — TELEPHONE ENCOUNTER
Pt left a vm on derm phone. Pt called, No answer. VM does not identify pt. Left general message for pt to call the [facility] back at [phone]....Trang De Leon RN

## 2017-04-24 NOTE — TELEPHONE ENCOUNTER
Left message for patient to call  [employer] at [phone]. Fourth attempt, closing encounter.    Fernanda Blevins LPN

## 2017-04-24 NOTE — TELEPHONE ENCOUNTER
Patient returned clinics call and states that she would like a pre op with Dr. Gonzalez but that she prefers to be seen at the [facility]. Patient will call to schedule at the [facility].     Latricia Parks LPN

## 2017-06-08 ENCOUNTER — TELEPHONE (OUTPATIENT)
Dept: SURGERY | Facility: AMBULATORY SURGERY CENTER | Age: 37
End: 2017-06-08

## 2017-06-15 ENCOUNTER — TELEPHONE (OUTPATIENT)
Dept: SURGERY | Facility: CLINIC | Age: 37
End: 2017-06-15

## 2017-06-15 ENCOUNTER — ANESTHESIA EVENT (OUTPATIENT)
Dept: SURGERY | Facility: AMBULATORY SURGERY CENTER | Age: 37
End: 2017-06-15

## 2017-06-15 NOTE — TELEPHONE ENCOUNTER
Pt called, No answer.  does not identify pt. Left general message for pt to call the [facility] back at [phone]. Appt on 6/27 with Dr. Gonzalez needs to be rescheduled as he will not be in clinic..Trang eD Leon RN

## 2017-06-15 NOTE — TELEPHONE ENCOUNTER
Patient rescheduled to July 11, 2017 for Dr. Gonzalez out of office for a few weeks.    Latricia Parks LPN

## 2017-06-16 ENCOUNTER — HOSPITAL ENCOUNTER (OUTPATIENT)
Facility: AMBULATORY SURGERY CENTER | Age: 37
Discharge: HOME | End: 2017-06-16
Attending: PLASTIC SURGERY | Admitting: PLASTIC SURGERY

## 2017-06-16 ENCOUNTER — SURGERY (OUTPATIENT)
Age: 37
End: 2017-06-16
Payer: COMMERCIAL

## 2017-06-16 ENCOUNTER — ANESTHESIA (OUTPATIENT)
Dept: SURGERY | Facility: AMBULATORY SURGERY CENTER | Age: 37
End: 2017-06-16

## 2017-06-16 VITALS
DIASTOLIC BLOOD PRESSURE: 84 MMHG | SYSTOLIC BLOOD PRESSURE: 128 MMHG | RESPIRATION RATE: 16 BRPM | TEMPERATURE: 97.9 F | OXYGEN SATURATION: 98 %

## 2017-06-16 DIAGNOSIS — L98.7 EXCESSIVE AND REDUNDANT SKIN AND SUBCUTANEOUS TISSUE: Primary | ICD-10-CM

## 2017-06-16 LAB — BETA HCG QUAL IFA URINE: NEGATIVE

## 2017-06-16 PROCEDURE — G8916 PT W IV AB GIVEN ON TIME: HCPCS

## 2017-06-16 PROCEDURE — 15839 EXC EXCESSIVE SKN OTHER AREA: CPT | Mod: GC | Performed by: PLASTIC SURGERY

## 2017-06-16 PROCEDURE — 84703 CHORIONIC GONADOTROPIN ASSAY: CPT | Performed by: ANESTHESIOLOGY

## 2017-06-16 PROCEDURE — 15839 EXC EXCESSIVE SKN OTHER AREA: CPT

## 2017-06-16 PROCEDURE — G8907 PT DOC NO EVENTS ON DISCHARG: HCPCS

## 2017-06-16 RX ORDER — HYDRALAZINE HYDROCHLORIDE 20 MG/ML
2.5-5 INJECTION INTRAMUSCULAR; INTRAVENOUS EVERY 10 MIN PRN
Status: DISCONTINUED | OUTPATIENT
Start: 2017-06-16 | End: 2017-06-17 | Stop reason: HOSPADM

## 2017-06-16 RX ORDER — METOPROLOL TARTRATE 1 MG/ML
1-2 INJECTION, SOLUTION INTRAVENOUS EVERY 5 MIN PRN
Status: DISCONTINUED | OUTPATIENT
Start: 2017-06-16 | End: 2017-06-17 | Stop reason: HOSPADM

## 2017-06-16 RX ORDER — HYDROMORPHONE HYDROCHLORIDE 1 MG/ML
.3-.5 INJECTION, SOLUTION INTRAMUSCULAR; INTRAVENOUS; SUBCUTANEOUS EVERY 10 MIN PRN
Status: DISCONTINUED | OUTPATIENT
Start: 2017-06-16 | End: 2017-06-17 | Stop reason: HOSPADM

## 2017-06-16 RX ORDER — OXYCODONE HYDROCHLORIDE 5 MG/1
5-10 TABLET ORAL
Qty: 30 TABLET | Refills: 0 | Status: SHIPPED | OUTPATIENT
Start: 2017-06-16 | End: 2017-06-23

## 2017-06-16 RX ORDER — MEPERIDINE HYDROCHLORIDE 25 MG/ML
12.5 INJECTION INTRAMUSCULAR; INTRAVENOUS; SUBCUTANEOUS
Status: DISCONTINUED | OUTPATIENT
Start: 2017-06-16 | End: 2017-06-17 | Stop reason: HOSPADM

## 2017-06-16 RX ORDER — DEXAMETHASONE SODIUM PHOSPHATE 4 MG/ML
4 INJECTION, SOLUTION INTRA-ARTICULAR; INTRALESIONAL; INTRAMUSCULAR; INTRAVENOUS; SOFT TISSUE EVERY 10 MIN PRN
Status: DISCONTINUED | OUTPATIENT
Start: 2017-06-16 | End: 2017-06-17 | Stop reason: HOSPADM

## 2017-06-16 RX ORDER — GABAPENTIN 300 MG/1
300 CAPSULE ORAL ONCE
Status: COMPLETED | OUTPATIENT
Start: 2017-06-16 | End: 2017-06-16

## 2017-06-16 RX ORDER — ACETAMINOPHEN 650 MG/1
650 SUPPOSITORY RECTAL EVERY 4 HOURS PRN
Status: DISCONTINUED | OUTPATIENT
Start: 2017-06-16 | End: 2017-06-17 | Stop reason: HOSPADM

## 2017-06-16 RX ORDER — SODIUM CHLORIDE, SODIUM LACTATE, POTASSIUM CHLORIDE, CALCIUM CHLORIDE 600; 310; 30; 20 MG/100ML; MG/100ML; MG/100ML; MG/100ML
INJECTION, SOLUTION INTRAVENOUS CONTINUOUS
Status: DISCONTINUED | OUTPATIENT
Start: 2017-06-16 | End: 2017-06-17 | Stop reason: HOSPADM

## 2017-06-16 RX ORDER — CEFAZOLIN SODIUM 2 G/100ML
2 INJECTION, SOLUTION INTRAVENOUS
Status: COMPLETED | OUTPATIENT
Start: 2017-06-16 | End: 2017-06-16

## 2017-06-16 RX ORDER — OXYCODONE HYDROCHLORIDE 5 MG/1
5 TABLET ORAL EVERY 4 HOURS PRN
Status: DISCONTINUED | OUTPATIENT
Start: 2017-06-16 | End: 2017-06-17 | Stop reason: HOSPADM

## 2017-06-16 RX ORDER — ONDANSETRON 4 MG/1
4 TABLET, ORALLY DISINTEGRATING ORAL EVERY 6 HOURS PRN
Qty: 8 TABLET | Refills: 0 | Status: SHIPPED | OUTPATIENT
Start: 2017-06-16

## 2017-06-16 RX ORDER — FENTANYL CITRATE 50 UG/ML
25-50 INJECTION, SOLUTION INTRAMUSCULAR; INTRAVENOUS
Status: DISCONTINUED | OUTPATIENT
Start: 2017-06-16 | End: 2017-06-17 | Stop reason: HOSPADM

## 2017-06-16 RX ORDER — LIDOCAINE HYDROCHLORIDE 20 MG/ML
INJECTION, SOLUTION INFILTRATION; PERINEURAL PRN
Status: DISCONTINUED | OUTPATIENT
Start: 2017-06-16 | End: 2017-06-16

## 2017-06-16 RX ORDER — ACETAMINOPHEN 325 MG/1
975 TABLET ORAL ONCE
Status: COMPLETED | OUTPATIENT
Start: 2017-06-16 | End: 2017-06-16

## 2017-06-16 RX ORDER — DIPHENHYDRAMINE HCL 25 MG
25 CAPSULE ORAL EVERY 6 HOURS PRN
Status: DISCONTINUED | OUTPATIENT
Start: 2017-06-16 | End: 2017-06-17 | Stop reason: HOSPADM

## 2017-06-16 RX ORDER — BUPIVACAINE HYDROCHLORIDE 2.5 MG/ML
INJECTION, SOLUTION INFILTRATION; PERINEURAL PRN
Status: DISCONTINUED | OUTPATIENT
Start: 2017-06-16 | End: 2017-06-16 | Stop reason: HOSPADM

## 2017-06-16 RX ORDER — OXYCODONE HYDROCHLORIDE 5 MG/1
5-10 TABLET ORAL ONCE
Status: COMPLETED | OUTPATIENT
Start: 2017-06-16 | End: 2017-06-16

## 2017-06-16 RX ORDER — ONDANSETRON 4 MG/1
4 TABLET, ORALLY DISINTEGRATING ORAL EVERY 30 MIN PRN
Status: DISCONTINUED | OUTPATIENT
Start: 2017-06-16 | End: 2017-06-17 | Stop reason: HOSPADM

## 2017-06-16 RX ORDER — AMOXICILLIN 250 MG
1-2 CAPSULE ORAL 2 TIMES DAILY
Qty: 30 TABLET | Refills: 0 | Status: SHIPPED | OUTPATIENT
Start: 2017-06-16 | End: 2017-06-23

## 2017-06-16 RX ORDER — ONDANSETRON 2 MG/ML
4 INJECTION INTRAMUSCULAR; INTRAVENOUS EVERY 30 MIN PRN
Status: DISCONTINUED | OUTPATIENT
Start: 2017-06-16 | End: 2017-06-17 | Stop reason: HOSPADM

## 2017-06-16 RX ORDER — PROPOFOL 10 MG/ML
INJECTION, EMULSION INTRAVENOUS PRN
Status: DISCONTINUED | OUTPATIENT
Start: 2017-06-16 | End: 2017-06-16

## 2017-06-16 RX ORDER — PROPOFOL 10 MG/ML
INJECTION, EMULSION INTRAVENOUS CONTINUOUS PRN
Status: DISCONTINUED | OUTPATIENT
Start: 2017-06-16 | End: 2017-06-16

## 2017-06-16 RX ORDER — ONDANSETRON 2 MG/ML
INJECTION INTRAMUSCULAR; INTRAVENOUS PRN
Status: DISCONTINUED | OUTPATIENT
Start: 2017-06-16 | End: 2017-06-16

## 2017-06-16 RX ORDER — DIPHENHYDRAMINE HYDROCHLORIDE 50 MG/ML
12.5 INJECTION INTRAMUSCULAR; INTRAVENOUS EVERY 6 HOURS PRN
Status: DISCONTINUED | OUTPATIENT
Start: 2017-06-16 | End: 2017-06-17 | Stop reason: HOSPADM

## 2017-06-16 RX ORDER — DEXAMETHASONE SODIUM PHOSPHATE 10 MG/ML
INJECTION, SOLUTION INTRAMUSCULAR; INTRAVENOUS PRN
Status: DISCONTINUED | OUTPATIENT
Start: 2017-06-16 | End: 2017-06-16

## 2017-06-16 RX ORDER — NALOXONE HYDROCHLORIDE 0.4 MG/ML
.1-.4 INJECTION, SOLUTION INTRAMUSCULAR; INTRAVENOUS; SUBCUTANEOUS
Status: DISCONTINUED | OUTPATIENT
Start: 2017-06-16 | End: 2017-06-17 | Stop reason: HOSPADM

## 2017-06-16 RX ADMIN — LIDOCAINE HYDROCHLORIDE 60 MG: 20 INJECTION, SOLUTION INFILTRATION; PERINEURAL at 07:12

## 2017-06-16 RX ADMIN — Medication 1 MG: at 07:12

## 2017-06-16 RX ADMIN — ACETAMINOPHEN 975 MG: 325 TABLET ORAL at 06:46

## 2017-06-16 RX ADMIN — ONDANSETRON 4 MG: 2 INJECTION INTRAMUSCULAR; INTRAVENOUS at 08:30

## 2017-06-16 RX ADMIN — CEFAZOLIN SODIUM 2 G: 2 INJECTION, SOLUTION INTRAVENOUS at 07:22

## 2017-06-16 RX ADMIN — DIPHENHYDRAMINE HYDROCHLORIDE 12.5 MG: 50 INJECTION INTRAMUSCULAR; INTRAVENOUS at 09:25

## 2017-06-16 RX ADMIN — OXYCODONE HYDROCHLORIDE 10 MG: 5 TABLET ORAL at 09:00

## 2017-06-16 RX ADMIN — SODIUM CHLORIDE, SODIUM LACTATE, POTASSIUM CHLORIDE, CALCIUM CHLORIDE: 600; 310; 30; 20 INJECTION, SOLUTION INTRAVENOUS at 07:08

## 2017-06-16 RX ADMIN — DEXAMETHASONE SODIUM PHOSPHATE 10 MG: 10 INJECTION, SOLUTION INTRAMUSCULAR; INTRAVENOUS at 07:16

## 2017-06-16 RX ADMIN — PROPOFOL 200 MG: 10 INJECTION, EMULSION INTRAVENOUS at 07:12

## 2017-06-16 RX ADMIN — HYDRALAZINE HYDROCHLORIDE 5 MG: 20 INJECTION INTRAMUSCULAR; INTRAVENOUS at 10:09

## 2017-06-16 RX ADMIN — HYDRALAZINE HYDROCHLORIDE 5 MG: 20 INJECTION INTRAMUSCULAR; INTRAVENOUS at 09:40

## 2017-06-16 RX ADMIN — GABAPENTIN 300 MG: 300 CAPSULE ORAL at 06:46

## 2017-06-16 RX ADMIN — BUPIVACAINE HYDROCHLORIDE 50 ML: 2.5 INJECTION, SOLUTION INFILTRATION; PERINEURAL at 08:34

## 2017-06-16 RX ADMIN — PROPOFOL 175 MCG/KG/MIN: 10 INJECTION, EMULSION INTRAVENOUS at 07:12

## 2017-06-16 RX ADMIN — Medication 1 MG: at 07:49

## 2017-06-16 NOTE — IP AVS SNAPSHOT
[facility]    [address] [place] [address]    Phone:  [phone]                                       After Visit Summary   6/16/2017    Emily Perez    MRN: [id number]           After Visit Summary Signature Page     I have received my discharge instructions, and my questions have been answered. I have discussed any challenges I see with this plan with the nurse or doctor.    ..........................................................................................................................................  Patient/Patient Representative Signature      ..........................................................................................................................................  Patient Representative Print Name and Relationship to Patient    ..................................................               ................................................  Date                                            Time    ..........................................................................................................................................  Reviewed by Signature/Title    ...................................................              ..............................................  Date                                                            Time

## 2017-06-16 NOTE — ANESTHESIA POSTPROCEDURE EVALUATION
Patient: Emily Perez    Procedure(s):  Cosmetic upper back lift - Wound Class: I-Clean    Diagnosis:excessive skin of upper back  Diagnosis Additional Information: No value filed.    Anesthesia Type:  General, ETT    Note:  Anesthesia Post Evaluation    Patient location during evaluation: PACU  Patient participation: Able to fully participate in evaluation  Level of consciousness: awake  Pain management: adequate  Airway patency: patent  Cardiovascular status: acceptable  Respiratory status: acceptable  Hydration status: acceptable  PONV: none     Anesthetic complications: None    Comments: Stable recovery noted.        Last vitals:  Vitals:    06/16/17 0646 06/16/17 0850   BP: 119/78    Resp: 16    Temp: 98.9  F (37.2  C) 97.6  F (36.4  C)   SpO2: 100%          Electronically Signed By: Monty Nettles MD  June 16, 2017  9:02 AM

## 2017-06-16 NOTE — DISCHARGE INSTRUCTIONS
[facility]  Same-Day Surgery   Adult Discharge Orders & Instructions   For 24 hours after surgery  1. Get plenty of rest.  A responsible adult must stay with you for at least 24 hours after you leave the hospital.   2. Do not drive or use heavy equipment.  If you have weakness or tingling, don't drive or use heavy equipment until this feeling goes away.  3. Do not drink alcohol.  4. Avoid strenuous or risky activities.  Ask for help when climbing stairs.   5. You may feel lightheaded.  IF so, sit for a few minutes before standing.  Have someone help you get up.   6. If you have nausea (feel sick to your stomach): Drink only clear liquids such as apple juice, ginger ale, broth or 7-Up.  Rest may also help.  Be sure to drink enough fluids.  Move to a regular diet as you feel able.  7. You may have a slight fever. Call the doctor if your fever is over 100 F (37.7 C) (taken under the tongue) or lasts longer than 24 hours.  8. You may have a dry mouth, a sore throat, muscle aches or trouble sleeping.  These should go away after 24 hours.  9. Do not make important or legal decisions.   Call your doctor for any of the followin.  Signs of infection (fever, growing tenderness at the surgery site, a large amount of drainage or bleeding, severe pain, foul-smelling drainage, redness, swelling).    2. It has been over 8 to 10 hours since surgery and you are still not able to urinate (pass water).    3.  Headache for over 24 hours.    To contact a doctor:  [phone] - during office hours  [phone] - After office hours, ask for the plastic surgery resident on call       BACK LIFT POST-OPERATIVE INSTRUCTIONS    Instructions   Depending on the complexity of your surgery, you may need to be    admitted to the hospital for a few days.    Have someone drive you home after surgery and help you at home for week.    Get plenty of rest.    Follow balanced diet.    Decreased activity may promote  constipation, so you may want to add    more raw fruit to your diet, and be sure to increase fluid intake.    Take pain medication as prescribed. May take Ibuprofen /Tylenol in addition.    Do not drink alcohol when taking pain medications.    Even when not taking pain medications, no alcohol for 3 weeks as it     causes fluid retention.    Do not smoke, as smoking delays healing and increases the risk of  complications.    If you are provided with an arm wrap, wear it as instructed.     Activities      Start walking as soon as possible, as this helps to reduce swelling and     lowers the chance of blood clots. Keep arms elevated as much as possible   above the level of the heart.    Do not drive until you are no longer taking any pain medications    (narcotics).     Do not drive until you have full range of motion in your arms and no    discomfort in your arms.    No lifting greater than 5-10 lbs. for 4-6 weeks.    Resume sexual activity as comfort permits, usually 2-3 weeks     postoperatively.    Strenuous exercise and activities are restricted for 6 weeks.    Return to work in 3- 6 weeks as directed by your surgeon.    Incision Care    Your surgeon may use staples or sutures to close your incision. You may also     have drains inserted following your surgery (not in your case).    You may shower 24 hours after surgery if no drains used, or 24 hours after drains removed if used. ACE bandage for 2 weeks. Keep snug.     Avoid exposing scars to sun for at least 12 months.    Always use a strong sunblock, if sun exposure is unavoidable (SPF 50 or    greater).    Keep the tape/glue on and allow it to peel off over time.    Keep incisions clean and inspect daily for signs of infection.    Start moisturizing your abdomen and scar by day 10 after surgery.    No tub soaking while sutures or drains are in place.    Use the arm wraps for 4-6 weeks, re-wrap them as needed starting from the hands to the upper arms, firm and  not too tightly; may pad incision with     dressings for comfort.    No tub soaking, bathing, or swimming while sutures or drains are in place.    What to Expect    You may experience temporary pain, soreness, numbness of arm/chest     skin, incision discomfort.Your hand may feel numb for a few hours after surgery due to the numbing medicine used in the surgery.    Maximum discomfort will occur the first few days.    You will have bruising and swelling of the arms. The majority of     bruising and swelling will subside in 6-8 weeks.    You may feel tired for several weeks or months.    One or more of your drains may remain in for several weeks.    Appearance      You may notice swelling and bruising of the arm/chest area for a week or so.    Scars will be reddened for 6 months. After that, they will fade and soften.    The scar will extend from the elbow to the armpit and side of your chest.     Follow-Up Care    If used, drains removed when less than 30 ml for 24 hours.    Usually, absorbable stitches are used. Surface staples (if used) removed in 2       weeks but may remain in longer if there is concern of incision separation.    Please note my office will call you 1-2 business days after your procedure to check up on how you're doing and to schedule your post-operative appointment.     When to Call    If you have increased swelling or bruising despite loosening of your warps. Un-wrap and re-wrap if the wrap feels too tight.    If swelling and redness persist after a few days.    If you have increased redness along the incision. If you have severe or                increased pain not relieved by medication.    If you have any side effects to medications; such as, rash, nausea,    headache, vomiting.    If you have an oral temperature over 100.4 degrees.    If you have any yellowish or greenish drainage from the incisions or    notice a foul odor.      If you have bleeding from the incisions that is difficult to  control with light              pressure.    If you have loss of feeling or motion.     For Medical Questions, Please Call:    [phone], Monday-Friday, 8 a.m.- 4:30 pm     After hours and on weekends, call Hospital Paging at [phone] and ask        for the Plastic Surgeon on call.

## 2017-06-16 NOTE — ANESTHESIA PREPROCEDURE EVALUATION
Anesthesia Evaluation     . Pt has had prior anesthetic. Type: General    No history of anesthetic complications          ROS/MED HX    ENT/Pulmonary:  - neg pulmonary ROS     Neurologic:  - neg neurologic ROS     Cardiovascular:  - neg cardiovascular ROS       METS/Exercise Tolerance:     Hematologic:  - neg hematologic  ROS       Musculoskeletal:  - neg musculoskeletal ROS       GI/Hepatic:  - neg GI/hepatic ROS       Renal/Genitourinary:  - ROS Renal section negative       Endo:  - neg endo ROS       Psychiatric:  - neg psychiatric ROS       Infectious Disease:  - neg infectious disease ROS       Malignancy:      - no malignancy   Other:    - neg other ROS                 Physical Exam  Normal systems: cardiovascular, pulmonary and dental    Airway   Mallampati: I  TM distance: >3 FB  Neck ROM: full    Dental     Cardiovascular       Pulmonary    breath sounds clear to auscultation                    Anesthesia Plan      History & Physical Review  History and physical reviewed and following examination; no interval change.    ASA Status:  1 .    NPO Status:  > 8 hours    Plan for General and ETT with Intravenous and Propofol induction. Maintenance will be TIVA.    PONV prophylaxis:  Ondansetron (or other 5HT-3) and Dexamethasone or Solumedrol       Postoperative Care  Postoperative pain management:  Multi-modal analgesia.      Consents  Anesthetic plan, risks, benefits and alternatives discussed with:  Patient..                          .

## 2017-06-16 NOTE — IP AVS SNAPSHOT
MRN:[id number]                      After Visit Summary   6/16/2017    Emily Perez    MRN: [id number]           Thank you!     Thank you for choosing [facility] for your care. Our goal is always to provide you with excellent care. Hearing back from our patients is one way we can continue to improve our services. Please take a few minutes to complete the written survey that you may receive in the mail after you visit with us. Thank you!        Patient Information     Date Of Birth          1980        About your hospital stay     You were admitted on:  June 16, 2017 You last received care in the:  [facility]    You were discharged on:  June 16, 2017       Who to Call     For medical emergencies, please call 911.  For non-urgent questions about your medical care, please call your primary care provider or clinic, [phone]  For questions related to your surgery, please call your surgery clinic        Attending Provider     Provider Specialty    ABHI Gonzalez MD Plastic Surgery       Primary Care Provider Office Phone # Fax #    Kelly Ramirez PA-C [phone]      Your next 10 appointments already scheduled     Jul 11, 2017  1:20 PM CDT   Return Visit with ABHI Gonzalez MD   [facility]    [address] [place] [id number] [phone]              Further instructions from your care team       [facility]  Same-Day Surgery   Adult Discharge Orders & Instructions   For 24 hours after surgery  1. Get plenty of rest.  A responsible adult must stay with you for at least 24 hours after you leave the hospital.   2. Do not drive or use heavy equipment.  If you have weakness or tingling, don't drive or use heavy equipment until this feeling goes away.  3. Do not drink alcohol.  4. Avoid strenuous or risky activities.  Ask for help when climbing stairs.   5. You may  feel lightheaded.  IF so, sit for a few minutes before standing.  Have someone help you get up.   6. If you have nausea (feel sick to your stomach): Drink only clear liquids such as apple juice, ginger ale, broth or 7-Up.  Rest may also help.  Be sure to drink enough fluids.  Move to a regular diet as you feel able.  7. You may have a slight fever. Call the doctor if your fever is over 100 F (37.7 C) (taken under the tongue) or lasts longer than 24 hours.  8. You may have a dry mouth, a sore throat, muscle aches or trouble sleeping.  These should go away after 24 hours.  9. Do not make important or legal decisions.   Call your doctor for any of the followin.  Signs of infection (fever, growing tenderness at the surgery site, a large amount of drainage or bleeding, severe pain, foul-smelling drainage, redness, swelling).    2. It has been over 8 to 10 hours since surgery and you are still not able to urinate (pass water).    3.  Headache for over 24 hours.    To contact a doctor:  [phone] - during office hours  [phone] - After office hours, ask for the plastic surgery resident on call       BACK LIFT POST-OPERATIVE INSTRUCTIONS    Instructions   Depending on the complexity of your surgery, you may need to be    admitted to the hospital for a few days.    Have someone drive you home after surgery and help you at home for week.    Get plenty of rest.    Follow balanced diet.    Decreased activity may promote constipation, so you may want to add    more raw fruit to your diet, and be sure to increase fluid intake.    Take pain medication as prescribed. May take Ibuprofen /Tylenol in addition.    Do not drink alcohol when taking pain medications.    Even when not taking pain medications, no alcohol for 3 weeks as it     causes fluid retention.    Do not smoke, as smoking delays healing and increases the risk of  complications.    If you are provided with an arm wrap, wear it as instructed.      Activities      Start walking as soon as possible, as this helps to reduce swelling and     lowers the chance of blood clots. Keep arms elevated as much as possible   above the level of the heart.    Do not drive until you are no longer taking any pain medications    (narcotics).     Do not drive until you have full range of motion in your arms and no    discomfort in your arms.    No lifting greater than 5-10 lbs. for 4-6 weeks.    Resume sexual activity as comfort permits, usually 2-3 weeks     postoperatively.    Strenuous exercise and activities are restricted for 6 weeks.    Return to work in 3- 6 weeks as directed by your surgeon.    Incision Care    Your surgeon may use staples or sutures to close your incision. You may also     have drains inserted following your surgery (not in your case).    You may shower 24 hours after surgery if no drains used, or 24 hours after drains removed if used. ACE bandage for 2 weeks. Keep snug.     Avoid exposing scars to sun for at least 12 months.    Always use a strong sunblock, if sun exposure is unavoidable (SPF 50 or    greater).    Keep the tape/glue on and allow it to peel off over time.    Keep incisions clean and inspect daily for signs of infection.    Start moisturizing your abdomen and scar by day 10 after surgery.    No tub soaking while sutures or drains are in place.    Use the arm wraps for 4-6 weeks, re-wrap them as needed starting from the hands to the upper arms, firm and not too tightly; may pad incision with     dressings for comfort.    No tub soaking, bathing, or swimming while sutures or drains are in place.    What to Expect    You may experience temporary pain, soreness, numbness of arm/chest     skin, incision discomfort.Your hand may feel numb for a few hours after surgery due to the numbing medicine used in the surgery.    Maximum discomfort will occur the first few days.    You will have bruising and swelling of the arms. The majority of      bruising and swelling will subside in 6-8 weeks.    You may feel tired for several weeks or months.    One or more of your drains may remain in for several weeks.    Appearance      You may notice swelling and bruising of the arm/chest area for a week or so.    Scars will be reddened for 6 months. After that, they will fade and soften.    The scar will extend from the elbow to the armpit and side of your chest.     Follow-Up Care    If used, drains removed when less than 30 ml for 24 hours.    Usually, absorbable stitches are used. Surface staples (if used) removed in 2       weeks but may remain in longer if there is concern of incision separation.    Please note my office will call you 1-2 business days after your procedure to check up on how you're doing and to schedule your post-operative appointment.     When to Call    If you have increased swelling or bruising despite loosening of your warps. Un-wrap and re-wrap if the wrap feels too tight.    If swelling and redness persist after a few days.    If you have increased redness along the incision. If you have severe or                increased pain not relieved by medication.    If you have any side effects to medications; such as, rash, nausea,    headache, vomiting.    If you have an oral temperature over 100.4 degrees.    If you have any yellowish or greenish drainage from the incisions or    notice a foul odor.      If you have bleeding from the incisions that is difficult to control with light              pressure.    If you have loss of feeling or motion.     For Medical Questions, Please Call:    [phone], Monday-Friday, 8 a.m.- 4:30 pm     After hours and on weekends, call Hospital Paging at [phone] and ask        for the Plastic Surgeon on call.        Pending Results     No orders found from 6/14/2017 to 6/17/2017.            Admission Information     Date & Time Provider Department Dept. Phone    6/16/2017 ABHI Gonzalez MD [facility]  [facility] [phone]      Your Vitals Were     Blood Pressure Temperature Respirations Last Period Pulse Oximetry       119/78 97.6  F (36.4  C) (Temporal) 16 06/13/2017 100%       [employer] Information     "[employer] gives you secure access to your electronic health record. If you see a primary care provider, you can also send messages to your care team and make appointments. If you have questions, please call your primary care clinic.  If you do not have a primary care provider, please call [phone] and they will assist you.        Care EveryWhere ID     This is your Care EveryWhere ID. This could be used by other organizations to access your [facility] medical records  [id number]           Review of your medicines      START taking        Dose / Directions    ondansetron 4 MG ODT tab   Commonly known as:  ZOFRAN-ODT   Used for:  Excessive and redundant skin and subcutaneous tissue        Dose:  4 mg   Take 1 tablet (4 mg) by mouth every 6 hours as needed for nausea   Quantity:  8 tablet   Refills:  0       oxyCODONE 5 MG IR tablet   Commonly known as:  ROXICODONE   Used for:  Excessive and redundant skin and subcutaneous tissue        Dose:  5-10 mg   Take 1-2 tablets (5-10 mg) by mouth every 3 hours as needed for other (Moderate to Severe Pain)   Quantity:  30 tablet   Refills:  0       senna-docusate 8.6-50 MG per tablet   Commonly known as:  SENOKOT-S;PERICOLACE   Used for:  Excessive and redundant skin and subcutaneous tissue        Dose:  1-2 tablet   Take 1-2 tablets by mouth 2 times daily   Quantity:  30 tablet   Refills:  0         CONTINUE these medicines which have NOT CHANGED        Dose / Directions    FIBERCON 625 MG tablet   Generic drug:  calcium polycarbophil        Dose:  1 tablet   Take 1 tablet by mouth   Refills:  0       levonorgestrel-ethinyl estradiol 0.1-20 MG-MCG per tablet   Commonly known as:  AVIANE,ALECHENGE,LESSINA        Dose:  1 tablet   Take 1 tablet by mouth daily    Refills:  0       Multi-vitamin Tabs tablet        Dose:  1 tablet   Take 1 tablet by mouth daily   Refills:  0       OMEGA-3 FISH OIL PO        Refills:  0       TYLENOL PO        Refills:  0       VITAMIN D (CHOLECALCIFEROL) PO        Take by mouth daily   Refills:  0            Where to get your medicines      These medications were sent to [facility] - [place], [place] - [address], [address]  [address], [address], [facility] [address]     Phone:  [phone]     ondansetron 4 MG ODT tab    senna-docusate 8.6-50 MG per tablet         Some of these will need a paper prescription and others can be bought over the counter. Ask your nurse if you have questions.     Bring a paper prescription for each of these medications     oxyCODONE 5 MG IR tablet                Protect others around you: Learn how to safely use, store and throw away your medicines at www.disposemymeds.org.             Medication List: This is a list of all your medications and when to take them. Check marks below indicate your daily home schedule. Keep this list as a reference.      Medications           Morning Afternoon Evening Bedtime As Needed    FIBERCON 625 MG tablet   Take 1 tablet by mouth   Generic drug:  calcium polycarbophil                                levonorgestrel-ethinyl estradiol 0.1-20 MG-MCG per tablet   Commonly known as:  AVIANE,ALECHENGE,LESSINA   Take 1 tablet by mouth daily                                Multi-vitamin Tabs tablet   Take 1 tablet by mouth daily                                OMEGA-3 FISH OIL PO                                ondansetron 4 MG ODT tab   Commonly known as:  ZOFRAN-ODT   Take 1 tablet (4 mg) by mouth every 6 hours as needed for nausea                                oxyCODONE 5 MG IR tablet   Commonly known as:  ROXICODONE   Take 1-2 tablets (5-10 mg) by mouth every 3 hours as needed for other (Moderate to Severe Pain)   Last time this was given:  10 mg on  6/16/2017  9:00 AM                                senna-docusate 8.6-50 MG per tablet   Commonly known as:  SENOKOT-S;PERICOLACE   Take 1-2 tablets by mouth 2 times daily                                TYLENOL PO   Last time this was given:  975 mg on 6/16/2017  6:46 AM                                VITAMIN D (CHOLECALCIFEROL) PO   Take by mouth daily

## 2017-06-16 NOTE — BRIEF OP NOTE
[facility] Brief Operative Note    Pre-operative diagnosis: excessive skin of upper back   Post-operative diagnosis * No post-op diagnosis entered *   Procedure: Procedure(s):  Cosmetic upper back lift - Wound Class: I-Clean   Surgeon: ABHI Gonzalez MD   Assistants(s): Ana Maria Choi MD   Estimated blood loss: Less than 50 ml    Specimens: None   Findings: No drain

## 2017-06-16 NOTE — OP NOTE
DATE OF SERVICE:  06/16/2017.      PREOPERATIVE DIAGNOSIS:  Excess upper back skin status post massive weight loss.      POSTOPERATIVE DIAGNOSIS:  Excess upper back skin status post massive weight loss.      PROCEDURE:  Cosmetic bra lift.      SURGEON:  Darrel Gonzalez MD      FELLOW:  Silvia Choi MD      ANESTHESIA:  General with endotracheal intubation.      COMPLICATIONS:  Nil.      DRAINS:  Nil.      SPECIMENS:  Nil.      BLOOD LOSS:  50 mL.      DESCRIPTION OF PROCEDURE:  After informed consent was taken from Emily Perez, proper site and procedure were ascertained with her and she was appropriately marked and taken to the operating room.  She was placed in supine position with the knees comfortably flexed, pillows underneath them with pneumo boots placed and running prior to induction of anesthesia.  Preoperative antibiotics were given in the OR.  All pressure points were appropriately padded.  General anesthesia was administered without any complications.  She was then placed in a prone position with all pressure points appropriately padded and her arms slightly flexed and abducted from the body.  She had preoperatively been marked for the bra lift.  We appropriately prepped and draped her.  Began by first rechecking the markings and tailor-tacking the closure with staples.  I remarked based on the tension, and then once I was assured that I would be able to close the wound after excising the skin primarily, I went ahead and began by making the incisions based on the skin excision that I wanted to do.  I then used electrocautery to dissect down through the subcutaneous tissues down to the deep fascia.  I then elevated the skin flaps on either side just superficial to the deep fascia and excised the skin completely.  I ensured strict hemostasis.  I then closed the wound in layers using #1 PDS suture in the Vi's layer, taking a small bite of the deep fascia with each of these interrupted bites to  decrease the dead space.  I then closed the skin using 2-0 Stratafix suture in the deep layer, as well as the dermal layer.  I then placed Prineo on top and wrapped the patient with an Ace wrap.  The patient tolerated the procedure well.  All counts correct at the end of the case.  The patient was extubated and sent to recovery room in stable condition.         ABHI GONZALEZ MD             D: 2017 08:44   T: 2017 12:57   MT:       Name:     CHANDRAKANT WISE   MRN:      [id number]        Account:        [id number]   :      1980           Procedure Date: 2017      Document: [id number]       cc: ABHI Gonzalez MD

## 2017-06-23 ENCOUNTER — OFFICE VISIT (OUTPATIENT)
Dept: SURGERY | Facility: CLINIC | Age: 37
End: 2017-06-23
Payer: COMMERCIAL

## 2017-06-23 DIAGNOSIS — L98.7 EXCESSIVE AND REDUNDANT SKIN AND SUBCUTANEOUS TISSUE: Primary | ICD-10-CM

## 2017-06-23 PROCEDURE — 99024 POSTOP FOLLOW-UP VISIT: CPT | Performed by: PLASTIC SURGERY

## 2017-06-23 NOTE — NURSING NOTE
"Emily Perez's goals for this visit include: post op body lift  She requests these members of her care team be copied on today's visit information: no    PCP: Kelly Ramirez    Referring Provider:  No referring provider defined for this encounter.    Chief Complaint   Patient presents with     Surgical Followup     post op body lift       Initial LMP 06/13/2017 Estimated body mass index is 22.61 kg/(m^2) as calculated from the following:    Height as of 3/22/17: 1.651 m (5' 5\").    Weight as of 3/22/17: 61.6 kg (135 lb 14.4 oz).  Medication Reconciliation: complete    Do you need any medication refills at today's visit? No    Fernanda Blevins LPN      "

## 2017-06-24 NOTE — PROGRESS NOTES
PRESENTING COMPLAINT:  Postoperative visit, status post cosmetic brow lift done on 2017.      HISTORY OF PRESENTING COMPLAINT:  Ms. Chandrakant Wise is 37 years old.  She is a week out from her surgery and overall has done extremely well, very happy with the results.  No issues.      PHYSICAL EXAMINATION:   VITAL SIGNS:  Stable.  She is afebrile.   GENERAL:  No obvious distress.   SKIN:  The incision is healing in well.  No evidence for infection, seroma or hematoma.  The aesthetics of the back has improved tremendously.      ASSESSMENT AND PLAN:  Based on the above findings, a diagnosis of a cosmetic brow lift was made.  I am happy with the result.  She is happy with the result.  I have asked her to start moisturizing the area, and to refrain from any stretching, and I will see her back in 4-6 weeks.  All exam done in the presence of a female chaperone.         ABHI OCASIO MD             D: 2017 14:19   T: 2017 08:45   MT: ARY#[id number]      Name:     CHANDRAKANT WISE   MRN:      [id number]        Account:      [id number]   :      1980           Visit Date:   2017      Document: [id number]       cc: Kelly Ramirez PA-C

## 2017-07-26 ENCOUNTER — OFFICE VISIT (OUTPATIENT)
Dept: PLASTIC SURGERY | Facility: CLINIC | Age: 37
End: 2017-07-26

## 2017-07-26 DIAGNOSIS — Z98.890 S/P ABDOMINOPLASTY: Primary | ICD-10-CM

## 2017-07-26 NOTE — PROGRESS NOTES
PRESENTING COMPLAINT:  Postoperative visit, status post cosmetic bra lift done on 06/16/2017.       HISTORY OF PRESENTING COMPLAINT:  Ms. Emily Perez is 37 years old.  She is 6 weeks out from her surgery and overall has done extremely well, very happy with the results.  No issues.       PHYSICAL EXAMINATION:   VITAL SIGNS:  Stable.  She is afebrile.   GENERAL:  No obvious distress.   SKIN:  The incision is healing in well.  No evidence for infection, seroma or hematoma.  The aesthetics of the back has improved tremendously.       ASSESSMENT AND PLAN:  Based on the above findings, a diagnosis of a cosmetic bra lift was made.  I am happy with the result.  She is happy with the result.  I have asked her to start moisturizing the area, and to refrain from any stretching, and I will see her back prn.  All exam done in the presence of a female chaperone.

## 2017-07-26 NOTE — LETTER
7/26/2017       RE: Emily Perez  [address] [address]     Dear Colleague,    Thank you for referring your patient, Emily Perez, to the [facility] at [facility]. Please see a copy of my visit note below.    PRESENTING COMPLAINT:  Postoperative visit, status post cosmetic bra lift done on 06/16/2017.       HISTORY OF PRESENTING COMPLAINT:  Ms. Emily Perez is 37 years old.  She is 6 weeks out from her surgery and overall has done extremely well, very happy with the results.  No issues.       PHYSICAL EXAMINATION:   VITAL SIGNS:  Stable.  She is afebrile.   GENERAL:  No obvious distress.   SKIN:  The incision is healing in well.  No evidence for infection, seroma or hematoma.  The aesthetics of the back has improved tremendously.       ASSESSMENT AND PLAN:  Based on the above findings, a diagnosis of a cosmetic bra lift was made.  I am happy with the result.  She is happy with the result.  I have asked her to start moisturizing the area, and to refrain from any stretching, and I will see her back prn.  All exam done in the presence of a female chaperone.    Again, thank you for allowing me to participate in the care of your patient.      Sincerely,    ABHI Gonzalez MD

## 2017-07-26 NOTE — MR AVS SNAPSHOT
After Visit Summary   7/26/2017    Emily Perez    MRN: [id number]           Patient Information     Date Of Birth          1980        Visit Information        Provider Department      7/26/2017 9:45 AM ABHI Gonzalez MD [facility] Plastic and Reconstructive Surgery        Today's Diagnoses     S/P abdominoplasty    -  1       Follow-ups after your visit        Follow-up notes from your care team     Return if symptoms worsen or fail to improve.      Who to contact     Please call your clinic at [phone] to:    Ask questions about your health    Make or cancel appointments    Discuss your medicines    Learn about your test results    Speak to your doctor   If you have compliments or concerns about an experience at your clinic, or if you wish to file a complaint, please contact [facility] Patient Relations at [phone] or email us at [email or web address]         Additional Information About Your Visit        MyChart Information     [employer] gives you secure access to your electronic health record. If you see a primary care provider, you can also send messages to your care team and make appointments. If you have questions, please call your primary care clinic.  If you do not have a primary care provider, please call [phone] and they will assist you.      [employer] is an electronic gateway that provides easy, online access to your medical records. With [employer], you can request a clinic appointment, read your test results, renew a prescription or communicate with your care team.     To access your existing account, please contact your [facility] or call [phone] for assistance.        Care EveryWhere ID     This is your Care EveryWhere ID. This could be used by other organizations to access your [facility] medical records  [id number]         Blood Pressure from Last 3 Encounters:   06/16/17 128/84   03/22/17  127/88   02/03/17 142/80    Weight from Last 3 Encounters:   03/22/17 135 lb 14.4 oz   01/18/17 136 lb 4.8 oz   11/16/16 134 lb 1.6 oz              Today, you had the following     No orders found for display       Primary Care Provider Office Phone # Fax #    Kelly Ramirez PA-C [phone]       [facility] [address] [place] [address]        Equal Access to Services     OLAF GUERRERO : Hadii aad ku hadasho Soomaali, waaxda luqadaha, qaybta kaalmada adeegyada, waxay idiin hayaan adeeg kharafrancisco javier laaracelis . So [facility] [phone].    ATENCIÓN: Si habla español, tiene a alonso disposición servicios gratuitos de asistencia lingüística. [facility] [phone].    We comply with applicable federal civil rights laws and [place] laws. We do not discriminate on the basis of race, color, national origin, age, disability sex, sexual orientation or gender identity.            Thank you!     Thank you for choosing [facility] PLASTIC [facility] RECON[facility]IVE SURGERY  for your care. Our goal is always to provide you with excellent care. Hearing back from our patients is one way we can continue to improve our services. Please take a few minutes to complete the written survey that you may receive in the mail after your visit with us. Thank you!             Your Updated Medication List - Protect others around you: Learn how to safely use, store and throw away your medicines at www.disposemymeds.org.          This list is accurate as of: 7/26/17 10:07 AM.  Always use your most recent med list.                   Brand Name Dispense Instructions for use Diagnosis    FIBERCON 625 MG tablet   Generic drug:  calcium polycarbophil      Take 1 tablet by mouth        levonorgestrel-ethinyl estradiol 0.1-20 MG-MCG per tablet    MARU HILL LESSINA     Take 1 tablet by mouth daily        Multi-vitamin Tabs tablet      Take 1 tablet by mouth daily        OMEGA-3 FISH OIL PO           ondansetron 4 MG ODT tab    ZOFRAN-ODT    8 tablet     Take 1 tablet (4 mg) by mouth every 6 hours as needed for nausea    Excessive and redundant skin and subcutaneous tissue       TYLENOL PO           VITAMIN D (CHOLECALCIFEROL) PO      Take by mouth daily

## 2017-12-31 ENCOUNTER — HEALTH MAINTENANCE LETTER (OUTPATIENT)
Age: 37
End: 2017-12-31

## 2020-03-10 ENCOUNTER — HEALTH MAINTENANCE LETTER (OUTPATIENT)
Age: 40
End: 2020-03-10

## 2020-12-27 ENCOUNTER — HEALTH MAINTENANCE LETTER (OUTPATIENT)
Age: 40
End: 2020-12-27

## 2021-04-24 ENCOUNTER — HEALTH MAINTENANCE LETTER (OUTPATIENT)
Age: 41
End: 2021-04-24

## 2021-10-04 ENCOUNTER — HEALTH MAINTENANCE LETTER (OUTPATIENT)
Age: 41
End: 2021-10-04

## 2022-05-15 ENCOUNTER — HEALTH MAINTENANCE LETTER (OUTPATIENT)
Age: 42
End: 2022-05-15

## 2022-09-11 ENCOUNTER — HEALTH MAINTENANCE LETTER (OUTPATIENT)
Age: 42
End: 2022-09-11

## 2023-06-03 ENCOUNTER — HEALTH MAINTENANCE LETTER (OUTPATIENT)
Age: 43
End: 2023-06-03

## 2024-02-24 ENCOUNTER — HEALTH MAINTENANCE LETTER (OUTPATIENT)
Age: 44
End: 2024-02-24

## (undated) DEVICE — SOL NACL 0.9% INJ 1000ML BAG 07983-09

## (undated) DEVICE — GLOVE PROTEXIS BLUE W/NEU-THERA 6.5  2D73EB65

## (undated) DEVICE — DECANTER TRANSFER DEVICE 2008S

## (undated) DEVICE — MARKER SKIN DOUBLE TIP W/FLEXI-RULER W/LABELS

## (undated) DEVICE — GOWN XLG DISP 9545

## (undated) DEVICE — SUCTION CANISTER MEDIVAC LINER 1500ML W/LID 65651-515

## (undated) DEVICE — PREP CHLORAPREP 26ML TINTED ORANGE  260815

## (undated) DEVICE — PACK MINOR SBA15MIFSE

## (undated) DEVICE — DRAPE SHEET HALF 40X60" 9358

## (undated) DEVICE — SU PDS II 1 CT-1 MONOFIL Z347H

## (undated) DEVICE — DRAPE IOBAN INCISE 23X17" 6650EZ

## (undated) DEVICE — ESU PENCIL W/SMOKE EVAC E2515HS

## (undated) DEVICE — SU DERMABOND PRINEO 22CM CLR222US

## (undated) DEVICE — BNDG ELASTIC 6" DBL LENGTH UNSTERILE 6611-16

## (undated) DEVICE — GLOVE PROTEXIS W/NEU-THERA 7.0  2D73TE70

## (undated) DEVICE — SYR BULB IRRIG DOVER 60 ML LATEX FREE 67000

## (undated) DEVICE — TUBING SUCTION 10'X3/16" N510

## (undated) DEVICE — SUCTION TIP YANKAUER W/O VENT K86

## (undated) DEVICE — SPONGE LAP 18X18" 1515

## (undated) DEVICE — ESU GROUND PAD ADULT W/CORD E7507

## (undated) DEVICE — GLOVE PROTEXIS W/NEU-THERA 6.0  2D73TE60

## (undated) DEVICE — GLOVE PROTEXIS W/NEU-THERA 6.5  2D73TE65

## (undated) DEVICE — ESU ELEC BLADE 2.75" COATED/INSULATED E1455

## (undated) DEVICE — NDL 19GA 1.5"

## (undated) DEVICE — BLADE KNIFE SURG 15 371115

## (undated) DEVICE — DRAPE SPLIT EENT 76X124" 3X28" 9447

## (undated) DEVICE — GLOVE PROTEXIS BLUE W/NEU-THERA 6.0  2D73EB60

## (undated) DEVICE — STPL SKIN 35W 054887

## (undated) DEVICE — SOL WATER IRRIG 1000ML BOTTLE 07139-09

## (undated) DEVICE — SU STRATAFIX 2-0 MH 36" SXPD2B412